# Patient Record
Sex: FEMALE | Race: WHITE | URBAN - METROPOLITAN AREA
[De-identification: names, ages, dates, MRNs, and addresses within clinical notes are randomized per-mention and may not be internally consistent; named-entity substitution may affect disease eponyms.]

---

## 2019-01-08 ENCOUNTER — HISTORICAL (OUTPATIENT)
Dept: CARDIOLOGY | Facility: HOSPITAL | Age: 59
End: 2019-01-08

## 2019-04-16 ENCOUNTER — HISTORICAL (OUTPATIENT)
Dept: RADIOLOGY | Facility: HOSPITAL | Age: 59
End: 2019-04-16

## 2019-08-06 ENCOUNTER — HISTORICAL (OUTPATIENT)
Dept: RADIOLOGY | Facility: HOSPITAL | Age: 59
End: 2019-08-06

## 2020-05-27 ENCOUNTER — HISTORICAL (OUTPATIENT)
Dept: RADIOLOGY | Facility: HOSPITAL | Age: 60
End: 2020-05-27

## 2020-05-27 LAB — POC CREATININE: 0.8 MG/DL (ref 0.6–1.3)

## 2020-06-01 ENCOUNTER — HISTORICAL (OUTPATIENT)
Dept: RADIOLOGY | Facility: HOSPITAL | Age: 60
End: 2020-06-01

## 2020-06-03 ENCOUNTER — HISTORICAL (OUTPATIENT)
Dept: ADMINISTRATIVE | Facility: HOSPITAL | Age: 60
End: 2020-06-03

## 2020-06-03 LAB
ABS NEUT (OLG): 3.15 X10(3)/MCL (ref 2.1–9.2)
ALBUMIN SERPL-MCNC: 3.9 GM/DL (ref 3.5–5)
ALBUMIN/GLOB SERPL: 1.2 RATIO (ref 1.1–2)
ALP SERPL-CCNC: 123 UNIT/L (ref 40–150)
ALT SERPL-CCNC: 25 UNIT/L (ref 0–55)
AST SERPL-CCNC: 25 UNIT/L (ref 5–34)
BASOPHILS # BLD AUTO: 0.1 X10(3)/MCL (ref 0–0.2)
BASOPHILS NFR BLD AUTO: 1.4 %
BILIRUB SERPL-MCNC: 0.4 MG/DL
BILIRUBIN DIRECT+TOT PNL SERPL-MCNC: 0.1 MG/DL (ref 0–0.5)
BILIRUBIN DIRECT+TOT PNL SERPL-MCNC: 0.3 MG/DL (ref 0–0.8)
BUN SERPL-MCNC: 7.1 MG/DL (ref 9.8–20.1)
CALCIUM SERPL-MCNC: 9.3 MG/DL (ref 8.4–10.2)
CEA SERPL-MCNC: 7.75 MG/ML (ref 0–3)
CHLORIDE SERPL-SCNC: 102 MMOL/L (ref 98–107)
CO2 SERPL-SCNC: 26 MMOL/L (ref 22–29)
CREAT SERPL-MCNC: 0.84 MG/DL (ref 0.55–1.02)
EOSINOPHIL # BLD AUTO: 0.1 X10(3)/MCL (ref 0–0.9)
EOSINOPHIL NFR BLD AUTO: 2.8 %
ERYTHROCYTE [DISTWIDTH] IN BLOOD BY AUTOMATED COUNT: 13.2 % (ref 11.5–17)
GLOBULIN SER-MCNC: 3.3 GM/DL (ref 2.4–3.5)
GLUCOSE SERPL-MCNC: 79 MG/DL (ref 74–100)
HCT VFR BLD AUTO: 47.5 % (ref 37–47)
HGB BLD-MCNC: 15.8 GM/DL (ref 12–16)
LYMPHOCYTES # BLD AUTO: 1.2 X10(3)/MCL (ref 0.6–4.6)
LYMPHOCYTES NFR BLD AUTO: 25 %
MCH RBC QN AUTO: 33.6 PG (ref 27–31)
MCHC RBC AUTO-ENTMCNC: 33.3 GM/DL (ref 33–36)
MCV RBC AUTO: 101.1 FL (ref 80–94)
MONOCYTES # BLD AUTO: 0.4 X10(3)/MCL (ref 0.1–1.3)
MONOCYTES NFR BLD AUTO: 7.6 %
NEUTROPHILS # BLD AUTO: 3.2 X10(3)/MCL (ref 2.1–9.2)
NEUTROPHILS NFR BLD AUTO: 63 %
PLATELET # BLD AUTO: 201 X10(3)/MCL (ref 130–400)
PMV BLD AUTO: 10 FL (ref 9.4–12.4)
POTASSIUM SERPL-SCNC: 4.8 MMOL/L (ref 3.5–5.1)
PROT SERPL-MCNC: 7.2 GM/DL (ref 6.4–8.3)
RBC # BLD AUTO: 4.7 X10(6)/MCL (ref 4.2–5.4)
SODIUM SERPL-SCNC: 139 MMOL/L (ref 136–145)
WBC # SPEC AUTO: 5 X10(3)/MCL (ref 4.5–11.5)

## 2020-06-15 ENCOUNTER — HISTORICAL (OUTPATIENT)
Dept: RADIOLOGY | Facility: HOSPITAL | Age: 60
End: 2020-06-15

## 2021-01-27 ENCOUNTER — HISTORICAL (OUTPATIENT)
Dept: ADMINISTRATIVE | Facility: HOSPITAL | Age: 61
End: 2021-01-27

## 2021-01-27 LAB
ABS NEUT (OLG): 4.37 X10(3)/MCL (ref 2.1–9.2)
ALBUMIN SERPL-MCNC: 4.2 GM/DL (ref 3.4–4.8)
ALBUMIN/GLOB SERPL: 1.3 RATIO (ref 1.1–2)
ALP SERPL-CCNC: 124 UNIT/L (ref 40–150)
ALT SERPL-CCNC: 45 UNIT/L (ref 0–55)
AST SERPL-CCNC: 39 UNIT/L (ref 5–34)
BASOPHILS # BLD AUTO: 0.1 X10(3)/MCL (ref 0–0.2)
BASOPHILS NFR BLD AUTO: 0.8 %
BILIRUB SERPL-MCNC: 0.7 MG/DL
BILIRUBIN DIRECT+TOT PNL SERPL-MCNC: 0.2 MG/DL (ref 0–0.5)
BILIRUBIN DIRECT+TOT PNL SERPL-MCNC: 0.5 MG/DL (ref 0–0.8)
BUN SERPL-MCNC: 6.1 MG/DL (ref 9.8–20.1)
CALCIUM SERPL-MCNC: 9.9 MG/DL (ref 8.4–10.2)
CEA SERPL-MCNC: 8.17 NG/ML (ref 0–3)
CHLORIDE SERPL-SCNC: 103 MMOL/L (ref 98–107)
CO2 SERPL-SCNC: 24 MMOL/L (ref 23–31)
CREAT SERPL-MCNC: 0.72 MG/DL (ref 0.55–1.02)
EOSINOPHIL # BLD AUTO: 0.1 X10(3)/MCL (ref 0–0.9)
EOSINOPHIL NFR BLD AUTO: 1.6 %
ERYTHROCYTE [DISTWIDTH] IN BLOOD BY AUTOMATED COUNT: 12.1 % (ref 11.5–17)
GLOBULIN SER-MCNC: 3.2 GM/DL (ref 2.4–3.5)
GLUCOSE SERPL-MCNC: 84 MG/DL (ref 82–115)
HCT VFR BLD AUTO: 52.5 % (ref 37–47)
HGB BLD-MCNC: 18.2 GM/DL (ref 12–16)
LYMPHOCYTES # BLD AUTO: 1.3 X10(3)/MCL (ref 0.6–4.6)
LYMPHOCYTES NFR BLD AUTO: 20.5 %
MCH RBC QN AUTO: 34.7 PG (ref 27–31)
MCHC RBC AUTO-ENTMCNC: 34.7 GM/DL (ref 33–36)
MCV RBC AUTO: 100.2 FL (ref 80–94)
MONOCYTES # BLD AUTO: 0.6 X10(3)/MCL (ref 0.1–1.3)
MONOCYTES NFR BLD AUTO: 9.3 %
NEUTROPHILS # BLD AUTO: 4.3 X10(3)/MCL (ref 2.1–9.2)
NEUTROPHILS NFR BLD AUTO: 67.6 %
PLATELET # BLD AUTO: 174 X10(3)/MCL (ref 130–400)
PMV BLD AUTO: 10.6 FL (ref 9.4–12.4)
POTASSIUM SERPL-SCNC: 4.3 MMOL/L (ref 3.5–5.1)
PROT SERPL-MCNC: 7.4 GM/DL (ref 5.8–7.6)
RBC # BLD AUTO: 5.24 X10(6)/MCL (ref 4.2–5.4)
SODIUM SERPL-SCNC: 140 MMOL/L (ref 136–145)
WBC # SPEC AUTO: 6.5 X10(3)/MCL (ref 4.5–11.5)

## 2021-07-07 ENCOUNTER — HISTORICAL (OUTPATIENT)
Dept: ADMINISTRATIVE | Facility: HOSPITAL | Age: 61
End: 2021-07-07

## 2021-07-07 LAB
ABS NEUT (OLG): 3.25 X10(3)/MCL (ref 2.1–9.2)
ALBUMIN SERPL-MCNC: 3.6 GM/DL (ref 3.4–4.8)
ALBUMIN/GLOB SERPL: 1.2 RATIO (ref 1.1–2)
ALP SERPL-CCNC: 112 UNIT/L (ref 40–150)
ALT SERPL-CCNC: 21 UNIT/L (ref 0–55)
AST SERPL-CCNC: 20 UNIT/L (ref 5–34)
BASOPHILS # BLD AUTO: 0 X10(3)/MCL (ref 0–0.2)
BASOPHILS NFR BLD AUTO: 0.8 %
BILIRUB SERPL-MCNC: 0.4 MG/DL
BILIRUBIN DIRECT+TOT PNL SERPL-MCNC: 0.2 MG/DL (ref 0–0.5)
BILIRUBIN DIRECT+TOT PNL SERPL-MCNC: 0.2 MG/DL (ref 0–0.8)
BUN SERPL-MCNC: 12.5 MG/DL (ref 9.8–20.1)
CALCIUM SERPL-MCNC: 9.9 MG/DL (ref 8.4–10.2)
CEA SERPL-MCNC: 7.8 NG/ML (ref 0–3)
CHLORIDE SERPL-SCNC: 101 MMOL/L (ref 98–107)
CO2 SERPL-SCNC: 29 MMOL/L (ref 23–31)
CREAT SERPL-MCNC: 0.8 MG/DL (ref 0.55–1.02)
EOSINOPHIL # BLD AUTO: 0.2 X10(3)/MCL (ref 0–0.9)
EOSINOPHIL NFR BLD AUTO: 3.3 %
ERYTHROCYTE [DISTWIDTH] IN BLOOD BY AUTOMATED COUNT: 13.1 % (ref 11.5–17)
GLOBULIN SER-MCNC: 3.1 GM/DL (ref 2.4–3.5)
GLUCOSE SERPL-MCNC: 80 MG/DL (ref 82–115)
HCT VFR BLD AUTO: 44 % (ref 37–47)
HGB BLD-MCNC: 15.3 GM/DL (ref 12–16)
LYMPHOCYTES # BLD AUTO: 1.2 X10(3)/MCL (ref 0.6–4.6)
LYMPHOCYTES NFR BLD AUTO: 22.2 %
MCH RBC QN AUTO: 35.8 PG (ref 27–31)
MCHC RBC AUTO-ENTMCNC: 34.8 GM/DL (ref 33–36)
MCV RBC AUTO: 103 FL (ref 80–94)
MONOCYTES # BLD AUTO: 0.6 X10(3)/MCL (ref 0.1–1.3)
MONOCYTES NFR BLD AUTO: 11 %
NEUTROPHILS # BLD AUTO: 3.2 X10(3)/MCL (ref 2.1–9.2)
NEUTROPHILS NFR BLD AUTO: 62.5 %
PLATELET # BLD AUTO: 154 X10(3)/MCL (ref 130–400)
PMV BLD AUTO: 9.6 FL (ref 9.4–12.4)
POTASSIUM SERPL-SCNC: 4.1 MMOL/L (ref 3.5–5.1)
PROT SERPL-MCNC: 6.7 GM/DL (ref 5.8–7.6)
RBC # BLD AUTO: 4.27 X10(6)/MCL (ref 4.2–5.4)
SODIUM SERPL-SCNC: 139 MMOL/L (ref 136–145)
WBC # SPEC AUTO: 5.2 X10(3)/MCL (ref 4.5–11.5)

## 2021-11-03 ENCOUNTER — HISTORICAL (OUTPATIENT)
Dept: HEMATOLOGY/ONCOLOGY | Facility: CLINIC | Age: 61
End: 2021-11-03

## 2021-11-03 LAB
ABS NEUT (OLG): 3.06 X10(3)/MCL (ref 2.1–9.2)
ALBUMIN SERPL-MCNC: 4.2 GM/DL (ref 3.4–4.8)
ALBUMIN/GLOB SERPL: 1.4 RATIO (ref 1.1–2)
ALP SERPL-CCNC: 132 UNIT/L (ref 40–150)
ALT SERPL-CCNC: 35 UNIT/L (ref 0–55)
AST SERPL-CCNC: 28 UNIT/L (ref 5–34)
BASOPHILS # BLD AUTO: 0 X10(3)/MCL (ref 0–0.2)
BASOPHILS NFR BLD AUTO: 0.9 %
BILIRUB SERPL-MCNC: 0.6 MG/DL
BILIRUBIN DIRECT+TOT PNL SERPL-MCNC: 0.2 MG/DL (ref 0–0.5)
BILIRUBIN DIRECT+TOT PNL SERPL-MCNC: 0.4 MG/DL (ref 0–0.8)
BUN SERPL-MCNC: 7.2 MG/DL (ref 9.8–20.1)
CALCIUM SERPL-MCNC: 10.2 MG/DL (ref 8.7–10.5)
CEA SERPL-MCNC: 8.86 NG/ML (ref 0–3)
CHLORIDE SERPL-SCNC: 101 MMOL/L (ref 98–107)
CO2 SERPL-SCNC: 29 MMOL/L (ref 23–31)
CREAT SERPL-MCNC: 0.83 MG/DL (ref 0.55–1.02)
EOSINOPHIL # BLD AUTO: 0.1 X10(3)/MCL (ref 0–0.9)
EOSINOPHIL NFR BLD AUTO: 3 %
ERYTHROCYTE [DISTWIDTH] IN BLOOD BY AUTOMATED COUNT: 13.5 % (ref 11.5–17)
GLOBULIN SER-MCNC: 2.9 GM/DL (ref 2.4–3.5)
GLUCOSE SERPL-MCNC: 106 MG/DL (ref 82–115)
HCT VFR BLD AUTO: 56.6 % (ref 37–47)
HGB BLD-MCNC: 19.3 GM/DL (ref 12–16)
LYMPHOCYTES # BLD AUTO: 1 X10(3)/MCL (ref 0.6–4.6)
LYMPHOCYTES NFR BLD AUTO: 21.6 %
MCH RBC QN AUTO: 36.3 PG (ref 27–31)
MCHC RBC AUTO-ENTMCNC: 34.1 GM/DL (ref 33–36)
MCV RBC AUTO: 106.4 FL (ref 80–94)
MONOCYTES # BLD AUTO: 0.4 X10(3)/MCL (ref 0.1–1.3)
MONOCYTES NFR BLD AUTO: 8.2 %
NEUTROPHILS # BLD AUTO: 3.1 X10(3)/MCL (ref 2.1–9.2)
NEUTROPHILS NFR BLD AUTO: 65.9 %
PLATELET # BLD AUTO: 139 X10(3)/MCL (ref 130–400)
PMV BLD AUTO: 10.1 FL (ref 9.4–12.4)
POTASSIUM SERPL-SCNC: 4.5 MMOL/L (ref 3.5–5.1)
PROT SERPL-MCNC: 7.1 GM/DL (ref 5.8–7.6)
RBC # BLD AUTO: 5.32 X10(6)/MCL (ref 4.2–5.4)
SODIUM SERPL-SCNC: 142 MMOL/L (ref 136–145)
WBC # SPEC AUTO: 4.6 X10(3)/MCL (ref 4.5–11.5)

## 2023-04-13 DIAGNOSIS — F32.89 OTHER DEPRESSION: ICD-10-CM

## 2023-04-13 NOTE — TELEPHONE ENCOUNTER
When I asked about her missing her 4/2022 appointment with us she said her car has been broken down for about a year and will need to check with her daughter to see when she can get help with transportation. I gave her my number and she agreed to call me back to get her appointment scheduled.

## 2023-04-17 RX ORDER — VENLAFAXINE HYDROCHLORIDE 75 MG/1
CAPSULE, EXTENDED RELEASE ORAL
Qty: 90 CAPSULE | Refills: 1 | OUTPATIENT
Start: 2023-04-17

## 2023-04-17 NOTE — TELEPHONE ENCOUNTER
I called and wa unable to reach patient. I left message on her VM to call me about an appt and if she decides not to return to see Dr. Marlow she should contact her PCP for refills of her venlafaxine.

## 2024-11-13 ENCOUNTER — LAB REQUISITION (OUTPATIENT)
Dept: LAB | Facility: HOSPITAL | Age: 64
End: 2024-11-13
Payer: MEDICARE

## 2024-11-13 DIAGNOSIS — I33.0 ACUTE AND SUBACUTE INFECTIVE ENDOCARDITIS: ICD-10-CM

## 2024-11-13 DIAGNOSIS — C20 MALIGNANT NEOPLASM OF RECTUM: ICD-10-CM

## 2024-11-13 LAB
ALBUMIN SERPL-MCNC: 2.9 G/DL (ref 3.4–4.8)
ALBUMIN/GLOB SERPL: 0.9 RATIO (ref 1.1–2)
ALP SERPL-CCNC: 125 UNIT/L (ref 40–150)
ALT SERPL-CCNC: 47 UNIT/L (ref 0–55)
ANION GAP SERPL CALC-SCNC: 14 MEQ/L
AST SERPL-CCNC: 52 UNIT/L (ref 5–34)
BASOPHILS # BLD AUTO: 0.05 X10(3)/MCL
BASOPHILS NFR BLD AUTO: 0.8 %
BILIRUB SERPL-MCNC: 0.6 MG/DL
BUN SERPL-MCNC: 4 MG/DL (ref 9.8–20.1)
CALCIUM SERPL-MCNC: 8.9 MG/DL (ref 8.4–10.2)
CHLORIDE SERPL-SCNC: 103 MMOL/L (ref 98–107)
CO2 SERPL-SCNC: 27 MMOL/L (ref 23–31)
CREAT SERPL-MCNC: 0.61 MG/DL (ref 0.55–1.02)
CREAT/UREA NIT SERPL: 7
CRP SERPL-MCNC: 19.9 MG/L
EOSINOPHIL # BLD AUTO: 0.18 X10(3)/MCL (ref 0–0.9)
EOSINOPHIL NFR BLD AUTO: 2.8 %
ERYTHROCYTE [DISTWIDTH] IN BLOOD BY AUTOMATED COUNT: 12.4 % (ref 11.5–17)
GFR SERPLBLD CREATININE-BSD FMLA CKD-EPI: >60 ML/MIN/1.73/M2
GLOBULIN SER-MCNC: 3.2 GM/DL (ref 2.4–3.5)
GLUCOSE SERPL-MCNC: 100 MG/DL (ref 82–115)
HCT VFR BLD AUTO: 42.2 % (ref 37–47)
HGB BLD-MCNC: 15 G/DL (ref 12–16)
IMM GRANULOCYTES # BLD AUTO: 0.01 X10(3)/MCL (ref 0–0.04)
IMM GRANULOCYTES NFR BLD AUTO: 0.2 %
LYMPHOCYTES # BLD AUTO: 0.99 X10(3)/MCL (ref 0.6–4.6)
LYMPHOCYTES NFR BLD AUTO: 15.3 %
MCH RBC QN AUTO: 32.7 PG (ref 27–31)
MCHC RBC AUTO-ENTMCNC: 35.5 G/DL (ref 33–36)
MCV RBC AUTO: 91.9 FL (ref 80–94)
MONOCYTES # BLD AUTO: 0.66 X10(3)/MCL (ref 0.1–1.3)
MONOCYTES NFR BLD AUTO: 10.2 %
NEUTROPHILS # BLD AUTO: 4.59 X10(3)/MCL (ref 2.1–9.2)
NEUTROPHILS NFR BLD AUTO: 70.7 %
PLATELET # BLD AUTO: 204 X10(3)/MCL (ref 130–400)
PMV BLD AUTO: 12.9 FL (ref 7.4–10.4)
POTASSIUM SERPL-SCNC: 2.9 MMOL/L (ref 3.5–5.1)
PROT SERPL-MCNC: 6.1 GM/DL (ref 5.8–7.6)
RBC # BLD AUTO: 4.59 X10(6)/MCL (ref 4.2–5.4)
SODIUM SERPL-SCNC: 144 MMOL/L (ref 136–145)
WBC # BLD AUTO: 6.48 X10(3)/MCL (ref 4.5–11.5)

## 2024-11-13 PROCEDURE — 86140 C-REACTIVE PROTEIN: CPT | Performed by: INTERNAL MEDICINE

## 2024-11-13 PROCEDURE — 85025 COMPLETE CBC W/AUTO DIFF WBC: CPT | Performed by: INTERNAL MEDICINE

## 2024-11-13 PROCEDURE — 80053 COMPREHEN METABOLIC PANEL: CPT | Performed by: INTERNAL MEDICINE

## 2024-11-14 DIAGNOSIS — Z85.048 HISTORY OF RECTAL OR ANAL CANCER: ICD-10-CM

## 2024-11-14 DIAGNOSIS — Z85.3 HISTORY OF BREAST CANCER: ICD-10-CM

## 2024-11-14 DIAGNOSIS — C78.7 METASTATIC ADENOCARCINOMA TO LIVER: Primary | ICD-10-CM

## 2024-11-22 ENCOUNTER — LAB REQUISITION (OUTPATIENT)
Dept: LAB | Facility: HOSPITAL | Age: 64
End: 2024-11-22
Payer: MEDICARE

## 2024-11-22 DIAGNOSIS — I65.22 OCCLUSION AND STENOSIS OF LEFT CAROTID ARTERY: ICD-10-CM

## 2024-11-22 DIAGNOSIS — I50.22 CHRONIC SYSTOLIC (CONGESTIVE) HEART FAILURE: ICD-10-CM

## 2024-11-22 DIAGNOSIS — I33.0 ACUTE AND SUBACUTE INFECTIVE ENDOCARDITIS: ICD-10-CM

## 2024-11-22 DIAGNOSIS — I25.10 ATHEROSCLEROTIC HEART DISEASE OF NATIVE CORONARY ARTERY WITHOUT ANGINA PECTORIS: ICD-10-CM

## 2024-11-22 DIAGNOSIS — J44.9 CHRONIC OBSTRUCTIVE PULMONARY DISEASE, UNSPECIFIED: ICD-10-CM

## 2024-11-22 DIAGNOSIS — C20 MALIGNANT NEOPLASM OF RECTUM: ICD-10-CM

## 2024-11-22 DIAGNOSIS — R19.5 OTHER FECAL ABNORMALITIES: ICD-10-CM

## 2024-11-22 LAB
ALBUMIN SERPL-MCNC: 2.7 G/DL (ref 3.4–4.8)
ALBUMIN/GLOB SERPL: 0.9 RATIO (ref 1.1–2)
ALP SERPL-CCNC: 130 UNIT/L (ref 40–150)
ALT SERPL-CCNC: 24 UNIT/L (ref 0–55)
ANION GAP SERPL CALC-SCNC: 12 MEQ/L
AST SERPL-CCNC: 43 UNIT/L (ref 5–34)
BASOPHILS # BLD AUTO: 0.04 X10(3)/MCL
BASOPHILS NFR BLD AUTO: 0.8 %
BILIRUB SERPL-MCNC: 1.3 MG/DL
BUN SERPL-MCNC: 5 MG/DL (ref 9.8–20.1)
CALCIUM SERPL-MCNC: 8 MG/DL (ref 8.4–10.2)
CHLORIDE SERPL-SCNC: 96 MMOL/L (ref 98–107)
CO2 SERPL-SCNC: 40 MMOL/L (ref 23–31)
CREAT SERPL-MCNC: 0.63 MG/DL (ref 0.55–1.02)
CREAT/UREA NIT SERPL: 8
CRP SERPL-MCNC: 15.7 MG/L
EOSINOPHIL # BLD AUTO: 0.18 X10(3)/MCL (ref 0–0.9)
EOSINOPHIL NFR BLD AUTO: 3.5 %
ERYTHROCYTE [DISTWIDTH] IN BLOOD BY AUTOMATED COUNT: 12.4 % (ref 11.5–17)
ERYTHROCYTE [SEDIMENTATION RATE] IN BLOOD: 29 MM/HR (ref 0–20)
GFR SERPLBLD CREATININE-BSD FMLA CKD-EPI: >60 ML/MIN/1.73/M2
GLOBULIN SER-MCNC: 3 GM/DL (ref 2.4–3.5)
GLUCOSE SERPL-MCNC: 82 MG/DL (ref 82–115)
HCT VFR BLD AUTO: 36.1 % (ref 37–47)
HGB BLD-MCNC: 12.9 G/DL (ref 12–16)
IMM GRANULOCYTES # BLD AUTO: 0.02 X10(3)/MCL (ref 0–0.04)
IMM GRANULOCYTES NFR BLD AUTO: 0.4 %
LYMPHOCYTES # BLD AUTO: 1.04 X10(3)/MCL (ref 0.6–4.6)
LYMPHOCYTES NFR BLD AUTO: 20 %
MCH RBC QN AUTO: 32.2 PG (ref 27–31)
MCHC RBC AUTO-ENTMCNC: 35.7 G/DL (ref 33–36)
MCV RBC AUTO: 90 FL (ref 80–94)
MONOCYTES # BLD AUTO: 0.47 X10(3)/MCL (ref 0.1–1.3)
MONOCYTES NFR BLD AUTO: 9.1 %
NEUTROPHILS # BLD AUTO: 3.44 X10(3)/MCL (ref 2.1–9.2)
NEUTROPHILS NFR BLD AUTO: 66.2 %
NRBC BLD AUTO-RTO: 0 %
PLATELET # BLD AUTO: 169 X10(3)/MCL (ref 130–400)
PMV BLD AUTO: 11.4 FL (ref 7.4–10.4)
POTASSIUM SERPL-SCNC: 2.2 MMOL/L (ref 3.5–5.1)
PROT SERPL-MCNC: 5.7 GM/DL (ref 5.8–7.6)
RBC # BLD AUTO: 4.01 X10(6)/MCL (ref 4.2–5.4)
SODIUM SERPL-SCNC: 148 MMOL/L (ref 136–145)
WBC # BLD AUTO: 5.19 X10(3)/MCL (ref 4.5–11.5)

## 2024-11-22 PROCEDURE — 80053 COMPREHEN METABOLIC PANEL: CPT | Performed by: NURSE PRACTITIONER

## 2024-11-22 PROCEDURE — 86140 C-REACTIVE PROTEIN: CPT | Performed by: NURSE PRACTITIONER

## 2024-11-22 PROCEDURE — 85652 RBC SED RATE AUTOMATED: CPT | Performed by: NURSE PRACTITIONER

## 2024-11-22 PROCEDURE — 85025 COMPLETE CBC W/AUTO DIFF WBC: CPT | Performed by: NURSE PRACTITIONER

## 2024-11-27 NOTE — PROGRESS NOTES
Subjective:       Patient ID: Natasha Padilla is a 64 y.o. female.     Chief Complaint: Newly diagnosed rectal cancer    Diagnosis: Metastatic rectal cancer                    Stage IA right breast cancer 7/20 (T1b N0 m0) - 1.0 cm, GII, ER/CT+, Her-2 neg, Oncotype RS 17                    Locally advanced, neglected left breast cancer 8/12 - clinical stage IIIC (T4c N3b M0) - ER/CT neg,  Her-2 neg                    S/p bilateral mastectomies                         Postmenopausal with intact uetrus    Treatment History  DD AC x4 --> L mastectomy 1/15 --> Weekly Taxol x12 (5/15) --> XRT  Resection AMARILIS met 6/16  Letrozole 7/20 - 3/22    Current Treatment: Treatment planning; lost to follow-up    Clinical History:  Patient was initially diagnosed with left breast cancer 8/12 by biopsy positive for infiltrating ductal carcinoma.  Pathology showed a grade 3 infiltrating ductal carcinoma, ER +1%, CT +83% and HER-2 equivocal at 2+.  She did not return for follow-up and did not receive any treatment at that time.  She presented with an enlarging left breast mass 10/14 and repeat biopsy at that time showed a grade 2 infiltrating ductal carcinoma that was triple negative for hormone receptor and HER-2 expression.  Metastatic workup was negative.  She received neoadjuvant chemotherapy followed by left mastectomy, adjuvant chemotherapy and radiation.  She had a solitary left upper lobe lung metastasis resected 6/16.  Pathology was consistent with metastatic breast cancer.  She had an early stage breast cancer on the right side with completely different pathology and underwent a right mastectomy followed by adjuvant hormonal therapy with an AI.  She was last seen in the office for follow-up 11/24/21 and did not return for additional visits.  She stopped taking her letrozole several months after her last appointment.  She was not seeing anyone for regular health maintenance and did not have a primary care provider.    She  presented to the ER at [facility] 10/21/24 after her family found her down on the bathroom floor at home.  She had right-sided weakness, affecting the arm more than the leg.  MRI of the brain showed small acute infarcts in the right cerebellum and left occipital lobes.  Admission H&H was 22 and 62.5 leading to a Hematology consult.  A bone marrow aspirate and biopsy was normal and she tested negative for a JAK2 mutation.  She was initiated on anticoagulation and developed rectal bleeding.  She reported a history of rectal bleeding intermittently for several months prior to admission.  Colonoscopy 11/5/24 showed a malignant appearing rectal mass 5-10 cm from the anal verge.  Multiple polyps were also removed.  Biopsy of the rectal mass was positive for invasive adenocarcinoma.      CT C/A/P 11/5/24:  Left apical scarring and a 2 mm nodule in the right mid lung.  There were multiple hepatic metastases, largest in the left lobe measured 3.9 x 3.1 cm.   MRI pelvis 11/6/24: T3 N1 rectal primary.    CT-guided liver biopsy 11/7/24:  Metastatic colorectal adenocarcinoma.  ECHO 10/20/24:  Global HK, EF 40-45%.  Mild-to-moderate MVR.    She was subsequently diagnosed with mitral valve endocarditis and 6 weeks of IV antibiotics were recommended.  She left the hospital AMA without scheduling any additional outpatient follow-up.    Interval History  She presents to my office today for a hospital follow-up visit accompanied by her daughter.  Patient is ambulatory with a rolling walker.  She has been doing home PT and OT through home health.  She will complete 6 weeks of IV Unasyn today for her endocarditis.  She does not currently have a PCP or follow-up scheduled with Cardiology.  She reports intermittent rectal bleeding which has improved.  She is not currently on any anticoagulation.  She has lost approximately 20 lb over the past several months.  She does feel pressure in the rectum but denies significant constipation or difficulty  passing her bowels.  She does not have any abdominal pain.  She reports no changes on her self examination following bilateral mastectomies.    Review of Systems   Constitutional:  Positive for activity change, appetite change, fatigue and unexpected weight change. Negative for fever.   HENT:  Negative for mouth sores, nosebleeds, sore throat and trouble swallowing.    Eyes: Negative.    Respiratory:  Negative for cough and shortness of breath.    Cardiovascular:  Negative for chest pain, palpitations and leg swelling.   Gastrointestinal:  Positive for blood in stool. Negative for abdominal distention, abdominal pain, change in bowel habit, constipation, diarrhea and nausea.   Genitourinary:  Negative for dysuria, flank pain, frequency and hematuria.   Musculoskeletal:  Positive for arthralgias. Negative for back pain.   Integumentary:  Negative for pallor and rash.   Neurological:  Positive for speech difficulty (mild), weakness and coordination difficulties. Negative for dizziness, numbness and headaches.   Hematological:  Negative for adenopathy. Does not bruise/bleed easily.   Psychiatric/Behavioral:  Positive for depressed mood. Negative for confusion and suicidal ideas. The patient is nervous/anxious.        PMHx:  CAD with stent, hyperlipidemia, carotid stenosis with right carotid stent, COPD, bilateral breast cancer, CVA, PVD, depression, alcohol abuse  PSHx:  Tonsils, left mastectomy 1/15, MediPort insertion and removal, jaw surgery, left hand surgery, AMARILIS wedge resection 6/16, right carotid stent, iliac artery stent, right mastectomy 7/20, bone marrow  SH:  Long-term smoker 1 ppd, quit 10/24.  Previous heavy alcohol use, quit 10/24.  Lives alone in [place], her daughter lives across the street.  FH:  Her sister had kidney cancer.  A paternal aunt had breast cancer and colon cancer, another paternal aunt had colon cancer.    Objective:        /60 (BP Location: Right leg, Patient Position: Sitting)    "Pulse 98   Temp 98.8 °F (37.1 °C)   Resp 16   Ht 5' 4" (1.626 m)   Wt 54 kg (119 lb)   SpO2 98%   BMI 20.43 kg/m²    Physical Exam  Constitutional:       Comments: Ill-appearing WF in NAD, ambulatory with a rolling walker   HENT:      Head: Normocephalic.      Mouth/Throat:      Mouth: Mucous membranes are moist.      Pharynx: Oropharynx is clear. No posterior oropharyngeal erythema.   Eyes:      General: No scleral icterus.     Extraocular Movements: Extraocular movements intact.      Conjunctiva/sclera: Conjunctivae normal.      Pupils: Pupils are equal, round, and reactive to light.   Cardiovascular:      Rate and Rhythm: Normal rate and regular rhythm.      Heart sounds: No murmur heard.  Pulmonary:      Comments: Decreased breath sounds at the bases, otherwise clear  Chest:      Comments: Well-healed bilateral mastectomy incisions.  No suspicious masses, skin changes or axillary nodes bilaterally.  Abdominal:      General: Bowel sounds are normal. There is no distension.      Palpations: Abdomen is soft. There is no mass.      Tenderness: There is no abdominal tenderness.      Comments: No palpable masses or organomegaly.   Musculoskeletal:         General: No swelling or tenderness. Normal range of motion.      Cervical back: Neck supple. No tenderness.   Lymphadenopathy:      Cervical: No cervical adenopathy.      Upper Body:      Right upper body: No supraclavicular or axillary adenopathy.      Left upper body: No supraclavicular or axillary adenopathy.   Skin:     General: Skin is warm and dry.      Findings: Bruising (scattered ecchymoses on arms) present. No rash.      Comments: PICC line LUE   Neurological:      Mental Status: She is alert and oriented to person, place, and time.      Cranial Nerves: No cranial nerve deficit.      Motor: Weakness (right-sided weakness, arm> leg) present.      Coordination: Coordination abnormal.   Psychiatric:         Mood and Affect: Mood normal.         Thought " Content: Thought content normal.         Judgment: Judgment normal.       ECOG SCORE    2 - Capable of all selfcare but unable to carry out any work activities, active > 50% of hours          LABORATORY  Recent Results (from the past week)   Comprehensive Metabolic Panel    Collection Time: 12/06/24  1:15 PM   Result Value Ref Range    Sodium 147 (H) 136 - 145 mmol/L    Potassium 2.3 (LL) 3.5 - 5.1 mmol/L    Chloride 98 98 - 107 mmol/L    CO2 36 (H) 23 - 31 mmol/L    Glucose 75 (L) 82 - 115 mg/dL    Blood Urea Nitrogen 3.0 (L) 9.8 - 20.1 mg/dL    Creatinine 0.61 0.55 - 1.02 mg/dL    Calcium 7.6 (L) 8.4 - 10.2 mg/dL    Protein Total 5.3 (L) 5.8 - 7.6 gm/dL    Albumin 2.2 (L) 3.4 - 4.8 g/dL    Globulin 3.1 2.4 - 3.5 gm/dL    Albumin/Globulin Ratio 0.7 (L) 1.1 - 2.0 ratio    Bilirubin Total 0.7 <=1.5 mg/dL     40 - 150 unit/L    ALT 41 0 - 55 unit/L    AST 70 (H) 5 - 34 unit/L    eGFR >60 mL/min/1.73/m2    Anion Gap 13.0 mEq/L    BUN/Creatinine Ratio 5    Sedimentation rate    Collection Time: 12/06/24  1:15 PM   Result Value Ref Range    Sed Rate 28 (H) 0 - 20 mm/hr   C-Reactive Protein    Collection Time: 12/06/24  1:15 PM   Result Value Ref Range    CRP 17.40 (H) <5.00 mg/L   CBC with Differential    Collection Time: 12/06/24  1:15 PM   Result Value Ref Range    WBC 6.33 4.50 - 11.50 x10(3)/mcL    RBC 3.33 (L) 4.20 - 5.40 x10(6)/mcL    Hgb 10.9 (L) 12.0 - 16.0 g/dL    Hct 30.8 (L) 37.0 - 47.0 %    MCV 92.5 80.0 - 94.0 fL    MCH 32.7 (H) 27.0 - 31.0 pg    MCHC 35.4 33.0 - 36.0 g/dL    RDW 13.5 11.5 - 17.0 %    Platelet 230 130 - 400 x10(3)/mcL    MPV 12.3 (H) 7.4 - 10.4 fL    Neut % 70.1 %    Lymph % 16.9 %    Mono % 9.3 %    Eos % 2.8 %    Basophil % 0.6 %    Lymph # 1.07 0.6 - 4.6 x10(3)/mcL    Neut # 4.43 2.1 - 9.2 x10(3)/mcL    Mono # 0.59 0.1 - 1.3 x10(3)/mcL    Eos # 0.18 0 - 0.9 x10(3)/mcL    Baso # 0.04 <=0.2 x10(3)/mcL    IG# 0.02 0 - 0.04 x10(3)/mcL    IG% 0.3 %   CBC with Differential    Collection  Time: 12/09/24  2:25 PM   Result Value Ref Range    WBC 6.05 4.50 - 11.50 x10(3)/mcL    RBC 3.47 (L) 4.20 - 5.40 x10(6)/mcL    Hgb 11.3 (L) 12.0 - 16.0 g/dL    Hct 33.0 (L) 37.0 - 47.0 %    MCV 95.1 (H) 80.0 - 94.0 fL    MCH 32.6 (H) 27.0 - 31.0 pg    MCHC 34.2 33.0 - 36.0 g/dL    RDW 13.8 11.5 - 17.0 %    Platelet 216 130 - 400 x10(3)/mcL    MPV 10.4 7.4 - 10.4 fL    Neut % 69.0 %    Lymph % 19.0 %    Mono % 8.6 %    Eos % 2.5 %    Basophil % 0.7 %    Lymph # 1.15 0.6 - 4.6 x10(3)/mcL    Neut # 4.18 2.1 - 9.2 x10(3)/mcL    Mono # 0.52 0.1 - 1.3 x10(3)/mcL    Eos # 0.15 0 - 0.9 x10(3)/mcL    Baso # 0.04 <=0.2 x10(3)/mcL    IG# 0.01 0 - 0.04 x10(3)/mcL    IG% 0.2 %          Assessment:   Metastatic rectal cancer  Recent CVA with right-sided hemiparesis  Mitral valve endocarditis - completing 6 weeks of IV antibiotics  Malnutrition and hypoalbuminemia  Mild anemia  Hypokalemia  Stage IA right breast cancer 7/20 - ANNA  Locally advanced, neglected left breast cancer 8/12 - ANNA  S/p bilateral mastectomies  Postmenopausal with intact uterus      Plan:   Extensive outside treatment records from her recent hospitalization and workup at [facility] were reviewed in preparation for this visit.  Her laboratory, pathology and imaging results were all reviewed and discussed in detail with the patient and her daughter.  She has biopsy-proven metastatic rectal cancer.  Her disease is incurable and any treatment would be delivered with palliative intent including relief of symptoms, prevention of disease-related complications and prolongation of life.    She does want to consider palliative treatment as long as she can tolerate it without significant toxicity.  I recommended initial treatment with IV Oxaliplatin 130 mg/m2 D1 + Xeloda at a starting dose of 1000 mg b.i.d. D1-14 until tolerance can be established on a 21 day treatment cycle.  Benefits, risks, toxicities and side effects of this treatment regimen were discussed and reviewed  in detail. All questions were answered. Literature regarding the treatment plan and specific drug information was also provided.  Her PICC line will remain in place for safe administration of IV chemotherapy.  Dressing changes are currently being done by her home health agency.  A baseline CEA level was obtained today.  She will continue oral potassium supplementation and may require additional IV supplementation as well.  She has completed the recommended 6 weeks of IV antibiotics for her endocarditis.  Referral will be made to cardiology for evaluation and follow-up.  She will also be referred to establish primary care follow-up.  She was previously seen by Dr. Connor in [place].  It is not safe for her to start oral anticoagulation due to increased risk of bleeding associated with her rectal cancer.  She will be tentatively scheduled to start treatment in approximately one-week after appropriate outpatient education to be provided to the patient and her daughter.  The importance of compliance with therapy and outpatient follow-up was discussed in detail with the patient and her daughter.  I will plan to re-evaluate her 2 weeks after her 1st cycle of palliative chemotherapy.  If she does well with the recommended treatment and her performance status improves, we will consider the addition of Bevacizumab.        KARLA PARKS MD    Other Physicians  Dr. Marisa Vang

## 2024-12-06 ENCOUNTER — LAB REQUISITION (OUTPATIENT)
Dept: LAB | Facility: HOSPITAL | Age: 64
End: 2024-12-06
Payer: MEDICARE

## 2024-12-06 DIAGNOSIS — I38 ENDOCARDITIS, VALVE UNSPECIFIED: ICD-10-CM

## 2024-12-06 DIAGNOSIS — I33.0 ACUTE AND SUBACUTE INFECTIVE ENDOCARDITIS: ICD-10-CM

## 2024-12-06 DIAGNOSIS — C20 MALIGNANT NEOPLASM OF RECTUM: ICD-10-CM

## 2024-12-06 LAB
ALBUMIN SERPL-MCNC: 2.2 G/DL (ref 3.4–4.8)
ALBUMIN/GLOB SERPL: 0.7 RATIO (ref 1.1–2)
ALP SERPL-CCNC: 136 UNIT/L (ref 40–150)
ALT SERPL-CCNC: 41 UNIT/L (ref 0–55)
ANION GAP SERPL CALC-SCNC: 13 MEQ/L
AST SERPL-CCNC: 70 UNIT/L (ref 5–34)
BASOPHILS # BLD AUTO: 0.04 X10(3)/MCL
BASOPHILS NFR BLD AUTO: 0.6 %
BILIRUB SERPL-MCNC: 0.7 MG/DL
BUN SERPL-MCNC: 3 MG/DL (ref 9.8–20.1)
CALCIUM SERPL-MCNC: 7.6 MG/DL (ref 8.4–10.2)
CHLORIDE SERPL-SCNC: 98 MMOL/L (ref 98–107)
CO2 SERPL-SCNC: 36 MMOL/L (ref 23–31)
CREAT SERPL-MCNC: 0.61 MG/DL (ref 0.55–1.02)
CREAT/UREA NIT SERPL: 5
CRP SERPL-MCNC: 17.4 MG/L
EOSINOPHIL # BLD AUTO: 0.18 X10(3)/MCL (ref 0–0.9)
EOSINOPHIL NFR BLD AUTO: 2.8 %
ERYTHROCYTE [DISTWIDTH] IN BLOOD BY AUTOMATED COUNT: 13.5 % (ref 11.5–17)
ERYTHROCYTE [SEDIMENTATION RATE] IN BLOOD: 28 MM/HR (ref 0–20)
GFR SERPLBLD CREATININE-BSD FMLA CKD-EPI: >60 ML/MIN/1.73/M2
GLOBULIN SER-MCNC: 3.1 GM/DL (ref 2.4–3.5)
GLUCOSE SERPL-MCNC: 75 MG/DL (ref 82–115)
HCT VFR BLD AUTO: 30.8 % (ref 37–47)
HGB BLD-MCNC: 10.9 G/DL (ref 12–16)
IMM GRANULOCYTES # BLD AUTO: 0.02 X10(3)/MCL (ref 0–0.04)
IMM GRANULOCYTES NFR BLD AUTO: 0.3 %
LYMPHOCYTES # BLD AUTO: 1.07 X10(3)/MCL (ref 0.6–4.6)
LYMPHOCYTES NFR BLD AUTO: 16.9 %
MCH RBC QN AUTO: 32.7 PG (ref 27–31)
MCHC RBC AUTO-ENTMCNC: 35.4 G/DL (ref 33–36)
MCV RBC AUTO: 92.5 FL (ref 80–94)
MONOCYTES # BLD AUTO: 0.59 X10(3)/MCL (ref 0.1–1.3)
MONOCYTES NFR BLD AUTO: 9.3 %
NEUTROPHILS # BLD AUTO: 4.43 X10(3)/MCL (ref 2.1–9.2)
NEUTROPHILS NFR BLD AUTO: 70.1 %
PLATELET # BLD AUTO: 230 X10(3)/MCL (ref 130–400)
PMV BLD AUTO: 12.3 FL (ref 7.4–10.4)
POTASSIUM SERPL-SCNC: 2.3 MMOL/L (ref 3.5–5.1)
PROT SERPL-MCNC: 5.3 GM/DL (ref 5.8–7.6)
RBC # BLD AUTO: 3.33 X10(6)/MCL (ref 4.2–5.4)
SODIUM SERPL-SCNC: 147 MMOL/L (ref 136–145)
WBC # BLD AUTO: 6.33 X10(3)/MCL (ref 4.5–11.5)

## 2024-12-06 PROCEDURE — 86140 C-REACTIVE PROTEIN: CPT | Performed by: INTERNAL MEDICINE

## 2024-12-06 PROCEDURE — 85025 COMPLETE CBC W/AUTO DIFF WBC: CPT | Performed by: INTERNAL MEDICINE

## 2024-12-06 PROCEDURE — 80053 COMPREHEN METABOLIC PANEL: CPT | Performed by: INTERNAL MEDICINE

## 2024-12-06 PROCEDURE — 85652 RBC SED RATE AUTOMATED: CPT | Performed by: INTERNAL MEDICINE

## 2024-12-09 ENCOUNTER — OFFICE VISIT (OUTPATIENT)
Dept: HEMATOLOGY/ONCOLOGY | Facility: CLINIC | Age: 64
End: 2024-12-09
Payer: MEDICARE

## 2024-12-09 VITALS
TEMPERATURE: 99 F | WEIGHT: 119 LBS | HEIGHT: 64 IN | OXYGEN SATURATION: 98 % | HEART RATE: 98 BPM | DIASTOLIC BLOOD PRESSURE: 60 MMHG | BODY MASS INDEX: 20.32 KG/M2 | SYSTOLIC BLOOD PRESSURE: 110 MMHG | RESPIRATION RATE: 16 BRPM

## 2024-12-09 DIAGNOSIS — C20 RECTAL CANCER: Primary | ICD-10-CM

## 2024-12-09 DIAGNOSIS — Z85.3 HISTORY OF BREAST CANCER: ICD-10-CM

## 2024-12-09 DIAGNOSIS — Z85.048 HISTORY OF RECTAL OR ANAL CANCER: ICD-10-CM

## 2024-12-09 DIAGNOSIS — C50.411 MALIGNANT NEOPLASM OF UPPER-OUTER QUADRANT OF RIGHT BREAST IN FEMALE, ESTROGEN RECEPTOR POSITIVE: ICD-10-CM

## 2024-12-09 DIAGNOSIS — E87.6 HYPOKALEMIA: ICD-10-CM

## 2024-12-09 DIAGNOSIS — C78.7 METASTATIC ADENOCARCINOMA TO LIVER: ICD-10-CM

## 2024-12-09 DIAGNOSIS — Z17.0 MALIGNANT NEOPLASM OF UPPER-OUTER QUADRANT OF RIGHT BREAST IN FEMALE, ESTROGEN RECEPTOR POSITIVE: ICD-10-CM

## 2024-12-09 DIAGNOSIS — C78.7 SECONDARY MALIGNANT NEOPLASM OF LIVER: ICD-10-CM

## 2024-12-09 LAB
ALBUMIN SERPL-MCNC: 2.3 G/DL (ref 3.4–4.8)
ALBUMIN/GLOB SERPL: 0.6 RATIO (ref 1.1–2)
ALP SERPL-CCNC: 142 UNIT/L (ref 40–150)
ALT SERPL-CCNC: 51 UNIT/L (ref 0–55)
ANION GAP SERPL CALC-SCNC: 13 MEQ/L
AST SERPL-CCNC: 91 UNIT/L (ref 5–34)
BASOPHILS # BLD AUTO: 0.04 X10(3)/MCL
BASOPHILS NFR BLD AUTO: 0.7 %
BILIRUB SERPL-MCNC: 0.7 MG/DL
BUN SERPL-MCNC: 3.3 MG/DL (ref 9.8–20.1)
CALCIUM SERPL-MCNC: 8 MG/DL (ref 8.4–10.2)
CEA SERPL-MCNC: 63.88 NG/ML (ref 0–3)
CHLORIDE SERPL-SCNC: 99 MMOL/L (ref 98–107)
CO2 SERPL-SCNC: 34 MMOL/L (ref 23–31)
CREAT SERPL-MCNC: 0.66 MG/DL (ref 0.55–1.02)
CREAT/UREA NIT SERPL: 5
EOSINOPHIL # BLD AUTO: 0.15 X10(3)/MCL (ref 0–0.9)
EOSINOPHIL NFR BLD AUTO: 2.5 %
ERYTHROCYTE [DISTWIDTH] IN BLOOD BY AUTOMATED COUNT: 13.8 % (ref 11.5–17)
GFR SERPLBLD CREATININE-BSD FMLA CKD-EPI: >60 ML/MIN/1.73/M2
GLOBULIN SER-MCNC: 3.7 GM/DL (ref 2.4–3.5)
GLUCOSE SERPL-MCNC: 86 MG/DL (ref 82–115)
HCT VFR BLD AUTO: 33 % (ref 37–47)
HGB BLD-MCNC: 11.3 G/DL (ref 12–16)
IMM GRANULOCYTES # BLD AUTO: 0.01 X10(3)/MCL (ref 0–0.04)
IMM GRANULOCYTES NFR BLD AUTO: 0.2 %
LYMPHOCYTES # BLD AUTO: 1.15 X10(3)/MCL (ref 0.6–4.6)
LYMPHOCYTES NFR BLD AUTO: 19 %
MAGNESIUM SERPL-MCNC: 1.7 MG/DL (ref 1.6–2.6)
MCH RBC QN AUTO: 32.6 PG (ref 27–31)
MCHC RBC AUTO-ENTMCNC: 34.2 G/DL (ref 33–36)
MCV RBC AUTO: 95.1 FL (ref 80–94)
MONOCYTES # BLD AUTO: 0.52 X10(3)/MCL (ref 0.1–1.3)
MONOCYTES NFR BLD AUTO: 8.6 %
NEUTROPHILS # BLD AUTO: 4.18 X10(3)/MCL (ref 2.1–9.2)
NEUTROPHILS NFR BLD AUTO: 69 %
PLATELET # BLD AUTO: 216 X10(3)/MCL (ref 130–400)
PMV BLD AUTO: 10.4 FL (ref 7.4–10.4)
POTASSIUM SERPL-SCNC: 2.1 MMOL/L (ref 3.5–5.1)
PROT SERPL-MCNC: 6 GM/DL (ref 5.8–7.6)
RBC # BLD AUTO: 3.47 X10(6)/MCL (ref 4.2–5.4)
SODIUM SERPL-SCNC: 146 MMOL/L (ref 136–145)
WBC # BLD AUTO: 6.05 X10(3)/MCL (ref 4.5–11.5)

## 2024-12-09 PROCEDURE — 82378 CARCINOEMBRYONIC ANTIGEN: CPT | Performed by: INTERNAL MEDICINE

## 2024-12-09 PROCEDURE — 99215 OFFICE O/P EST HI 40 MIN: CPT | Mod: S$PBB,,, | Performed by: INTERNAL MEDICINE

## 2024-12-09 PROCEDURE — 80053 COMPREHEN METABOLIC PANEL: CPT | Performed by: INTERNAL MEDICINE

## 2024-12-09 PROCEDURE — 99215 OFFICE O/P EST HI 40 MIN: CPT | Mod: PBBFAC | Performed by: INTERNAL MEDICINE

## 2024-12-09 PROCEDURE — 83735 ASSAY OF MAGNESIUM: CPT | Performed by: INTERNAL MEDICINE

## 2024-12-09 PROCEDURE — 85025 COMPLETE CBC W/AUTO DIFF WBC: CPT | Performed by: INTERNAL MEDICINE

## 2024-12-09 PROCEDURE — 99999 PR PBB SHADOW E&M-EST. PATIENT-LVL V: CPT | Mod: PBBFAC,,, | Performed by: INTERNAL MEDICINE

## 2024-12-09 PROCEDURE — 36415 COLL VENOUS BLD VENIPUNCTURE: CPT | Performed by: INTERNAL MEDICINE

## 2024-12-09 RX ORDER — HYDROCODONE BITARTRATE AND ACETAMINOPHEN 5; 325 MG/1; MG/1
1 TABLET ORAL EVERY 8 HOURS PRN
COMMUNITY
Start: 2024-11-11 | End: 2024-12-09

## 2024-12-09 RX ORDER — ATORVASTATIN CALCIUM 80 MG/1
80 TABLET, FILM COATED ORAL
COMMUNITY
Start: 2024-11-11 | End: 2024-12-11

## 2024-12-09 RX ORDER — AMPICILLIN AND SULBACTAM 100; 50 MG/ML; MG/ML
INJECTION, POWDER, FOR SOLUTION INTRAVENOUS
COMMUNITY
Start: 2024-12-04

## 2024-12-09 RX ORDER — SODIUM CHLORIDE 9 MG/ML
INJECTION, SOLUTION INTRAVENOUS
COMMUNITY
Start: 2024-12-04

## 2024-12-09 RX ORDER — MULTIVITAMIN
1 TABLET ORAL DAILY
COMMUNITY

## 2024-12-09 RX ORDER — METOPROLOL SUCCINATE 25 MG/1
12.5 TABLET, EXTENDED RELEASE ORAL
COMMUNITY
Start: 2024-11-11 | End: 2024-12-09

## 2024-12-10 ENCOUNTER — TELEPHONE (OUTPATIENT)
Dept: HEMATOLOGY/ONCOLOGY | Facility: CLINIC | Age: 64
End: 2024-12-10
Payer: MEDICARE

## 2024-12-10 DIAGNOSIS — E87.6 HYPOKALEMIA: Primary | ICD-10-CM

## 2024-12-10 DIAGNOSIS — C20 RECTAL CANCER: Primary | ICD-10-CM

## 2024-12-10 DIAGNOSIS — C78.7 SECONDARY MALIGNANT NEOPLASM OF LIVER: ICD-10-CM

## 2024-12-10 RX ORDER — POTASSIUM CHLORIDE 750 MG/1
20 CAPSULE, EXTENDED RELEASE ORAL 2 TIMES DAILY
Qty: 28 CAPSULE | Refills: 0 | Status: SHIPPED | OUTPATIENT
Start: 2024-12-10 | End: 2024-12-17

## 2024-12-10 RX ORDER — HEPARIN 100 UNIT/ML
500 SYRINGE INTRAVENOUS
OUTPATIENT
Start: 2024-12-16

## 2024-12-10 RX ORDER — SODIUM CHLORIDE 0.9 % (FLUSH) 0.9 %
10 SYRINGE (ML) INJECTION
OUTPATIENT
Start: 2024-12-16

## 2024-12-10 RX ORDER — CAPECITABINE 500 MG/1
1000 TABLET, FILM COATED ORAL 2 TIMES DAILY
Qty: 56 TABLET | Refills: 5 | Status: ACTIVE | OUTPATIENT
Start: 2024-12-10

## 2024-12-10 RX ORDER — EPINEPHRINE 0.3 MG/.3ML
0.3 INJECTION SUBCUTANEOUS ONCE AS NEEDED
OUTPATIENT
Start: 2024-12-16

## 2024-12-10 RX ORDER — DIPHENHYDRAMINE HYDROCHLORIDE 50 MG/ML
50 INJECTION INTRAMUSCULAR; INTRAVENOUS ONCE AS NEEDED
OUTPATIENT
Start: 2024-12-16

## 2024-12-10 NOTE — TELEPHONE ENCOUNTER
Phoned and spoke with patient daughter and advised her that her mother's K+ level drawn yesterday is low again and Dr. Marlow wants her to take Micro K BID for the next week. Rx sent to her pharmacy.

## 2024-12-10 NOTE — TELEPHONE ENCOUNTER
Nurse with [facility] (no name given) left message inquiring about picc care and asking if Dr. Marlow will sign orders for home health care management. Okay to send orders for both?

## 2024-12-12 ENCOUNTER — OFFICE VISIT (OUTPATIENT)
Dept: HEMATOLOGY/ONCOLOGY | Facility: CLINIC | Age: 64
End: 2024-12-12
Payer: MEDICARE

## 2024-12-12 ENCOUNTER — CLINICAL SUPPORT (OUTPATIENT)
Dept: HEMATOLOGY/ONCOLOGY | Facility: CLINIC | Age: 64
End: 2024-12-12
Payer: MEDICARE

## 2024-12-12 VITALS
TEMPERATURE: 98 F | BODY MASS INDEX: 20.29 KG/M2 | HEART RATE: 71 BPM | SYSTOLIC BLOOD PRESSURE: 142 MMHG | HEIGHT: 64 IN | RESPIRATION RATE: 16 BRPM | DIASTOLIC BLOOD PRESSURE: 80 MMHG | WEIGHT: 118.88 LBS

## 2024-12-12 DIAGNOSIS — Z85.3 HISTORY OF BREAST CANCER: ICD-10-CM

## 2024-12-12 DIAGNOSIS — C78.7 SECONDARY MALIGNANT NEOPLASM OF LIVER: ICD-10-CM

## 2024-12-12 DIAGNOSIS — R45.89 ANXIETY ABOUT HEALTH: ICD-10-CM

## 2024-12-12 DIAGNOSIS — C20 RECTAL CANCER: ICD-10-CM

## 2024-12-12 DIAGNOSIS — C20 RECTAL CANCER: Primary | ICD-10-CM

## 2024-12-12 PROCEDURE — 99999 PR PBB SHADOW E&M-EST. PATIENT-LVL II: CPT | Mod: PBBFAC,,,

## 2024-12-12 PROCEDURE — 99215 OFFICE O/P EST HI 40 MIN: CPT | Mod: S$PBB,,, | Performed by: NURSE PRACTITIONER

## 2024-12-12 PROCEDURE — 99999 PR PBB SHADOW E&M-EST. PATIENT-LVL IV: CPT | Mod: PBBFAC,,, | Performed by: NURSE PRACTITIONER

## 2024-12-12 PROCEDURE — 99212 OFFICE O/P EST SF 10 MIN: CPT | Mod: PBBFAC,27

## 2024-12-12 PROCEDURE — 99214 OFFICE O/P EST MOD 30 MIN: CPT | Mod: PBBFAC | Performed by: NURSE PRACTITIONER

## 2024-12-12 RX ORDER — ONDANSETRON HYDROCHLORIDE 8 MG/1
TABLET, FILM COATED ORAL
Qty: 30 TABLET | Refills: 3 | Status: SHIPPED | OUTPATIENT
Start: 2024-12-12

## 2024-12-12 NOTE — NURSING
Oncology Navigation   Intake  Date of Diagnosis: 24  Cancer Type: GI     Treatment  Current Status: Staging work-up       Medical Oncologist: [place]  Consult Date: 24  Chemotherapy: Planned  Chemotherapy Regimen: OP COLORECTAL CAPECITABINE OXALIPLATIN          General Referrals: Dietitian; Justino Still          Support Systems: Children; Family members  Barriers of Care: Transportation; Dietary / Nutrition  Dietary / Nutrition: Weight loss / gain  Transportation Barriers: Family only able to bring her on  and --appointments adjusted     Acuity  Stage: 2  Systemic Treatment - predicted or initiated: Chemotherapy Regimen with Multiple drugs (+1)  Treatment Tolerability: Has not started treatment yet/treatment fully completed and side effects resolved  ECO  Comorbidities in Medical History: 2  Hospitalization Within the Past Month: 0  Other Medical Factors (+2 each): In wheelchair   Needed: 0  Support: 0  Verbalizes Financial Concerns: 0  Transportation: 1  Mental Health: PHQ Score: 2  Psychological Factors (+1 each): Emotional during conversation  History of noncompliance/frequent no shows and cancellations: 2  Verbalizes the need for more education: 0  Navigation Acuity: 16     Follow Up  No follow-ups on file.     Met with patient today following education visit. She is accompanied by her daughter. Introduced self and role of navigation. Assessed for barriers to care. Patient reports that she does have anxiety and depression. She denies being suicidal. She is emotional during conversation today. Referral sent to [facility]. Her boyfriend is able to bring her to appointments but only on  and  when he does not have work. Message sent to  to try to accommodate this schedule. She does feel that she has a good support system. Patient has no further questions or concerns today. They are aware of how to reach navigation should they need to.

## 2024-12-12 NOTE — PROGRESS NOTES
Subjective:       Patient ID: Natasha Padilla is a 64 y.o. female.     Chief Complaint: Newly diagnosed rectal cancer    Diagnosis: Metastatic rectal cancer                    Stage IA right breast cancer 7/20 (T1b N0 m0) - 1.0 cm, GII, ER/HI+, Her-2 neg, Oncotype RS 17                    Locally advanced, neglected left breast cancer 8/12 - clinical stage IIIC (T4c N3b M0) - ER/HI neg,  Her-2 neg                    S/p bilateral mastectomies                         Postmenopausal with intact uetrus    Treatment History  DD AC x4 --> L mastectomy 1/15 --> Weekly Taxol x12 (5/15) --> XRT  Resection AMARILIS met 6/16  Letrozole 7/20 - 3/22    Current Treatment:  Patient education    Clinical History:  Patient was initially diagnosed with left breast cancer 8/12 by biopsy positive for infiltrating ductal carcinoma.  Pathology showed a grade 3 infiltrating ductal carcinoma, ER +1%, HI +83% and HER-2 equivocal at 2+.  She did not return for follow-up and did not receive any treatment at that time.  She presented with an enlarging left breast mass 10/14 and repeat biopsy at that time showed a grade 2 infiltrating ductal carcinoma that was triple negative for hormone receptor and HER-2 expression.  Metastatic workup was negative.  She received neoadjuvant chemotherapy followed by left mastectomy, adjuvant chemotherapy and radiation.  She had a solitary left upper lobe lung metastasis resected 6/16.  Pathology was consistent with metastatic breast cancer.  She had an early stage breast cancer on the right side with completely different pathology and underwent a right mastectomy followed by adjuvant hormonal therapy with an AI.  She was last seen in the office for follow-up 11/24/21 and did not return for additional visits.  She stopped taking her letrozole several months after her last appointment.  She was not seeing anyone for regular health maintenance and did not have a primary care provider.    She presented to the ER at  [facility] 10/21/24 after her family found her down on the bathroom floor at home.  She had right-sided weakness, affecting the arm more than the leg.  MRI of the brain showed small acute infarcts in the right cerebellum and left occipital lobes.  Admission H&H was 22 and 62.5 leading to a Hematology consult.  A bone marrow aspirate and biopsy was normal and she tested negative for a JAK2 mutation.  She was initiated on anticoagulation and developed rectal bleeding.  She reported a history of rectal bleeding intermittently for several months prior to admission.  Colonoscopy 11/5/24 showed a malignant appearing rectal mass 5-10 cm from the anal verge.  Multiple polyps were also removed.  Biopsy of the rectal mass was positive for invasive adenocarcinoma.      CT C/A/P 11/5/24:  Left apical scarring and a 2 mm nodule in the right mid lung.  There were multiple hepatic metastases, largest in the left lobe measured 3.9 x 3.1 cm.   MRI pelvis 11/6/24: T3 N1 rectal primary.    CT-guided liver biopsy 11/7/24:  Metastatic colorectal adenocarcinoma.  ECHO 10/20/24:  Global HK, EF 40-45%.  Mild-to-moderate MVR.    She was subsequently diagnosed with mitral valve endocarditis and 6 weeks of IV antibiotics were recommended.  She left the hospital AMA without scheduling any additional outpatient follow-up.    Dr. Marlow recommended palliative treatment of her metastatic rectal cancer with XELOX per NCCN guidelines.IV Oxaliplatin 130 mg/m2 D1 + Xeloda at a starting dose of 1000 mg b.i.d. D1-14 until tolerance can be established on a 21 day treatment cycle.  Her PICC line will remain in place for safe administration of IV chemotherapy.  If treatment was well tolerated, Bevacizumab would be added.    Interval History  She presents to the office today accompanied by her daughter for formal chemotherapy education.  She met with the nurse navigator today.  As outlined above, treatment was recommended with XELOX on a three week dosing  "schedule.  Plans are to begin treatment on 12/17 or 12/18/24.    Review of Systems   Constitutional:  Positive for activity change, appetite change, fatigue and unexpected weight change. Negative for fever.   HENT:  Negative for mouth sores, nosebleeds, sore throat and trouble swallowing.    Eyes: Negative.    Respiratory:  Negative for cough and shortness of breath.    Cardiovascular:  Negative for chest pain, palpitations and leg swelling.   Gastrointestinal:  Positive for blood in stool. Negative for abdominal distention, abdominal pain, change in bowel habit, constipation, diarrhea and nausea.   Genitourinary:  Negative for dysuria, flank pain, frequency and hematuria.   Musculoskeletal:  Positive for arthralgias. Negative for back pain.   Integumentary:  Negative for pallor and rash.   Neurological:  Positive for speech difficulty (mild), weakness and coordination difficulties. Negative for dizziness, numbness and headaches.   Hematological:  Negative for adenopathy. Does not bruise/bleed easily.   Psychiatric/Behavioral:  Positive for depressed mood. Negative for confusion and suicidal ideas. The patient is nervous/anxious.        PMHx:  CAD with stent, hyperlipidemia, carotid stenosis with right carotid stent, COPD, bilateral breast cancer, CVA, PVD, depression, alcohol abuse  PSHx:  Tonsils, left mastectomy 1/15, MediPort insertion and removal, jaw surgery, left hand surgery, AMARILIS wedge resection 6/16, right carotid stent, iliac artery stent, right mastectomy 7/20, bone marrow  SH:  Long-term smoker 1 ppd, quit 10/24.  Previous heavy alcohol use, quit 10/24.  Lives alone in [place], her daughter lives across the street.  FH:  Her sister had kidney cancer.  A paternal aunt had breast cancer and colon cancer, another paternal aunt had colon cancer.    Objective:        BP (!) 142/80   Pulse 71   Temp 97.7 °F (36.5 °C)   Resp 16   Ht 5' 4" (1.626 m)   Wt 53.9 kg (118 lb 14.4 oz)   BMI 20.41 kg/m²  "   Physical Exam  Constitutional:       Appearance: She is ill-appearing.   HENT:      Head: Normocephalic.      Mouth/Throat:      Mouth: Mucous membranes are moist.   Eyes:      General: No scleral icterus.     Extraocular Movements: Extraocular movements intact.      Conjunctiva/sclera: Conjunctivae normal.   Pulmonary:      Effort: Respiratory distress present.      Comments: Decreased breath sounds at the bases, otherwise clear  Chest:      Comments: Well-healed bilateral mastectomy incisions.  No suspicious masses, skin changes or axillary nodes bilaterally.  Musculoskeletal:         General: No swelling. Normal range of motion.      Cervical back: Normal range of motion and neck supple.   Lymphadenopathy:      Upper Body:      Right upper body: No supraclavicular or axillary adenopathy.      Left upper body: No supraclavicular or axillary adenopathy.   Skin:     General: Skin is warm and dry.      Findings: Bruising (scattered ecchymoses on arms) present. No rash.      Comments: PICC line LUE   Neurological:      Mental Status: She is alert and oriented to person, place, and time.      Cranial Nerves: No cranial nerve deficit.      Motor: Weakness (right-sided weakness, arm> leg) present.      Coordination: Coordination abnormal.   Psychiatric:         Mood and Affect: Mood normal.         Thought Content: Thought content normal.         Judgment: Judgment normal.       ECOG SCORE    2 - Capable of all selfcare but unable to carry out any work activities, active > 50% of hours          LABORATORY  Recent Results (from the past week)   Comprehensive Metabolic Panel    Collection Time: 12/06/24  1:15 PM   Result Value Ref Range    Sodium 147 (H) 136 - 145 mmol/L    Potassium 2.3 (LL) 3.5 - 5.1 mmol/L    Chloride 98 98 - 107 mmol/L    CO2 36 (H) 23 - 31 mmol/L    Glucose 75 (L) 82 - 115 mg/dL    Blood Urea Nitrogen 3.0 (L) 9.8 - 20.1 mg/dL    Creatinine 0.61 0.55 - 1.02 mg/dL    Calcium 7.6 (L) 8.4 - 10.2 mg/dL     Protein Total 5.3 (L) 5.8 - 7.6 gm/dL    Albumin 2.2 (L) 3.4 - 4.8 g/dL    Globulin 3.1 2.4 - 3.5 gm/dL    Albumin/Globulin Ratio 0.7 (L) 1.1 - 2.0 ratio    Bilirubin Total 0.7 <=1.5 mg/dL     40 - 150 unit/L    ALT 41 0 - 55 unit/L    AST 70 (H) 5 - 34 unit/L    eGFR >60 mL/min/1.73/m2    Anion Gap 13.0 mEq/L    BUN/Creatinine Ratio 5    Sedimentation rate    Collection Time: 12/06/24  1:15 PM   Result Value Ref Range    Sed Rate 28 (H) 0 - 20 mm/hr   C-Reactive Protein    Collection Time: 12/06/24  1:15 PM   Result Value Ref Range    CRP 17.40 (H) <5.00 mg/L   CBC with Differential    Collection Time: 12/06/24  1:15 PM   Result Value Ref Range    WBC 6.33 4.50 - 11.50 x10(3)/mcL    RBC 3.33 (L) 4.20 - 5.40 x10(6)/mcL    Hgb 10.9 (L) 12.0 - 16.0 g/dL    Hct 30.8 (L) 37.0 - 47.0 %    MCV 92.5 80.0 - 94.0 fL    MCH 32.7 (H) 27.0 - 31.0 pg    MCHC 35.4 33.0 - 36.0 g/dL    RDW 13.5 11.5 - 17.0 %    Platelet 230 130 - 400 x10(3)/mcL    MPV 12.3 (H) 7.4 - 10.4 fL    Neut % 70.1 %    Lymph % 16.9 %    Mono % 9.3 %    Eos % 2.8 %    Basophil % 0.6 %    Lymph # 1.07 0.6 - 4.6 x10(3)/mcL    Neut # 4.43 2.1 - 9.2 x10(3)/mcL    Mono # 0.59 0.1 - 1.3 x10(3)/mcL    Eos # 0.18 0 - 0.9 x10(3)/mcL    Baso # 0.04 <=0.2 x10(3)/mcL    IG# 0.02 0 - 0.04 x10(3)/mcL    IG% 0.3 %   Comprehensive Metabolic Panel    Collection Time: 12/09/24  2:25 PM   Result Value Ref Range    Sodium 146 (H) 136 - 145 mmol/L    Potassium 2.1 (LL) 3.5 - 5.1 mmol/L    Chloride 99 98 - 107 mmol/L    CO2 34 (H) 23 - 31 mmol/L    Glucose 86 82 - 115 mg/dL    Blood Urea Nitrogen 3.3 (L) 9.8 - 20.1 mg/dL    Creatinine 0.66 0.55 - 1.02 mg/dL    Calcium 8.0 (L) 8.4 - 10.2 mg/dL    Protein Total 6.0 5.8 - 7.6 gm/dL    Albumin 2.3 (L) 3.4 - 4.8 g/dL    Globulin 3.7 (H) 2.4 - 3.5 gm/dL    Albumin/Globulin Ratio 0.6 (L) 1.1 - 2.0 ratio    Bilirubin Total 0.7 <=1.5 mg/dL     40 - 150 unit/L    ALT 51 0 - 55 unit/L    AST 91 (H) 5 - 34 unit/L    eGFR >60  mL/min/1.73/m2    Anion Gap 13.0 mEq/L    BUN/Creatinine Ratio 5    CEA    Collection Time: 12/09/24  2:25 PM   Result Value Ref Range    Carcinoembryonic Antigen 63.88 (H) 0.00 - 3.00 ng/mL   Magnesium    Collection Time: 12/09/24  2:25 PM   Result Value Ref Range    Magnesium Level 1.70 1.60 - 2.60 mg/dL   CBC with Differential    Collection Time: 12/09/24  2:25 PM   Result Value Ref Range    WBC 6.05 4.50 - 11.50 x10(3)/mcL    RBC 3.47 (L) 4.20 - 5.40 x10(6)/mcL    Hgb 11.3 (L) 12.0 - 16.0 g/dL    Hct 33.0 (L) 37.0 - 47.0 %    MCV 95.1 (H) 80.0 - 94.0 fL    MCH 32.6 (H) 27.0 - 31.0 pg    MCHC 34.2 33.0 - 36.0 g/dL    RDW 13.8 11.5 - 17.0 %    Platelet 216 130 - 400 x10(3)/mcL    MPV 10.4 7.4 - 10.4 fL    Neut % 69.0 %    Lymph % 19.0 %    Mono % 8.6 %    Eos % 2.5 %    Basophil % 0.7 %    Lymph # 1.15 0.6 - 4.6 x10(3)/mcL    Neut # 4.18 2.1 - 9.2 x10(3)/mcL    Mono # 0.52 0.1 - 1.3 x10(3)/mcL    Eos # 0.15 0 - 0.9 x10(3)/mcL    Baso # 0.04 <=0.2 x10(3)/mcL    IG# 0.01 0 - 0.04 x10(3)/mcL    IG% 0.2 %          Assessment:   Metastatic rectal cancer  Recent CVA with right-sided hemiparesis  Mitral valve endocarditis - completing 6 weeks of IV antibiotics  Malnutrition and hypoalbuminemia  Mild anemia  Hypokalemia  Stage IA right breast cancer 7/20 - ANNA  Locally advanced, neglected left breast cancer 8/12 - ANNA  S/p bilateral mastectomies  Postmenopausal with intact uterus      Plan:   Per NCCN guidelines, treatment was recommended with XELOX/CAPOX.  IV Oxaliplatin 130 mg/m2 D1, Pre-meds Benadryl 25 mg IV, Alox 0.25 mg IV, Dexamethasone 12 mg IV  PO Xeloda 1000 mg BID days 1-14, followed by a 7 day break.  Zofran 8 mg PO BID on days 2 & 3, and every 8 hours PRN.    Patient educated on the expected side effects of the above regimen, included but not limited to low blood counts, hair loss, infusion reaction, temperature sensitivity, peripheral neuropathy, mucositis, rash, hand and foot syndrome, decreased appetite,  taste changes, N/V/D/C, fatigue, weakness.  Written literature on all potential side effects provided.    Side effect management specific to N/V/C/D constipation outlined.  Written instructions provided.      Prescription for Zofran 8 mg PO BID on post chemo days 2 & 3 and Q8PRN provided.    Patient to hold Xeloda and contact the office with intractable N/V/D, Inability to eat/drink, fever.    Follow-up appointment with Dr. Marlow on 12/31/24 at 9am.      Patient to call with questions or concerns in the interim.    Total face to face time with patient and her daughter was 35 minutes.    All questions answered to the satisfaction of the patient.    SOURAV CLEMENTE, FNP-C  [facility]     Other Physicians  Dr. Marisa Parham

## 2024-12-18 ENCOUNTER — INFUSION (OUTPATIENT)
Dept: INFUSION THERAPY | Facility: HOSPITAL | Age: 64
End: 2024-12-18
Attending: INTERNAL MEDICINE
Payer: MEDICARE

## 2024-12-18 VITALS
SYSTOLIC BLOOD PRESSURE: 118 MMHG | RESPIRATION RATE: 16 BRPM | HEART RATE: 93 BPM | DIASTOLIC BLOOD PRESSURE: 84 MMHG | TEMPERATURE: 98 F | OXYGEN SATURATION: 100 %

## 2024-12-18 DIAGNOSIS — C78.7 SECONDARY MALIGNANT NEOPLASM OF LIVER: Primary | ICD-10-CM

## 2024-12-18 DIAGNOSIS — C20 RECTAL CANCER: ICD-10-CM

## 2024-12-18 PROCEDURE — 25000003 PHARM REV CODE 250: Performed by: INTERNAL MEDICINE

## 2024-12-18 PROCEDURE — 96367 TX/PROPH/DG ADDL SEQ IV INF: CPT

## 2024-12-18 PROCEDURE — 96415 CHEMO IV INFUSION ADDL HR: CPT

## 2024-12-18 PROCEDURE — 96413 CHEMO IV INFUSION 1 HR: CPT

## 2024-12-18 PROCEDURE — 96375 TX/PRO/DX INJ NEW DRUG ADDON: CPT

## 2024-12-18 PROCEDURE — 63600175 PHARM REV CODE 636 W HCPCS: Performed by: INTERNAL MEDICINE

## 2024-12-18 RX ORDER — DIPHENHYDRAMINE HYDROCHLORIDE 50 MG/ML
25 INJECTION INTRAMUSCULAR; INTRAVENOUS
Status: COMPLETED | OUTPATIENT
Start: 2024-12-18 | End: 2024-12-18

## 2024-12-18 RX ORDER — DIPHENHYDRAMINE HYDROCHLORIDE 50 MG/ML
50 INJECTION INTRAMUSCULAR; INTRAVENOUS ONCE AS NEEDED
Status: DISCONTINUED | OUTPATIENT
Start: 2024-12-18 | End: 2024-12-18 | Stop reason: HOSPADM

## 2024-12-18 RX ORDER — EPINEPHRINE 0.3 MG/.3ML
0.3 INJECTION SUBCUTANEOUS ONCE AS NEEDED
Status: DISCONTINUED | OUTPATIENT
Start: 2024-12-18 | End: 2024-12-18 | Stop reason: HOSPADM

## 2024-12-18 RX ORDER — SODIUM CHLORIDE 0.9 % (FLUSH) 0.9 %
10 SYRINGE (ML) INJECTION
Status: DISCONTINUED | OUTPATIENT
Start: 2024-12-18 | End: 2024-12-18 | Stop reason: HOSPADM

## 2024-12-18 RX ORDER — HEPARIN 100 UNIT/ML
500 SYRINGE INTRAVENOUS
Status: DISCONTINUED | OUTPATIENT
Start: 2024-12-18 | End: 2024-12-18 | Stop reason: HOSPADM

## 2024-12-18 RX ADMIN — DEXTROSE MONOHYDRATE: 50 INJECTION, SOLUTION INTRAVENOUS at 10:12

## 2024-12-18 RX ADMIN — OXALIPLATIN 200 MG: 5 INJECTION, SOLUTION INTRAVENOUS at 11:12

## 2024-12-18 RX ADMIN — DIPHENHYDRAMINE HYDROCHLORIDE 25 MG: 50 INJECTION INTRAMUSCULAR; INTRAVENOUS at 10:12

## 2024-12-18 RX ADMIN — PALONOSETRON HYDROCHLORIDE 0.25 MG: 0.25 INJECTION INTRAVENOUS at 10:12

## 2024-12-19 NOTE — PROGRESS NOTES
Subjective:       Patient ID: Natasha Padilla is a 64 y.o. female.     Chief Complaint:  Weight loss    Diagnosis: Metastatic rectal cancer                    Stage IA right breast cancer 7/20 (T1b N0 m0) - 1.0 cm, GII, ER/MA+, Her-2 neg, Oncotype RS 17                    Locally advanced, neglected left breast cancer 8/12 - clinical stage IIIC (T4c N3b M0) - ER/MA neg,  Her-2 neg                    S/p bilateral mastectomies                         Postmenopausal with intact uetrus    Treatment History  DD AC x4 --> L mastectomy 1/15 --> Weekly Taxol x12 (5/15) --> XRT  Resection AMARILIS met 6/16  Letrozole 7/20 - 3/22    Current Treatment: Oxaliplatin/Xeloda s/p 1 cycle (12/18/24)    Clinical History:  Patient was initially diagnosed with left breast cancer 8/12 by biopsy positive for infiltrating ductal carcinoma.  Pathology showed a grade 3 infiltrating ductal carcinoma, ER +1%, MA +83% and HER-2 equivocal at 2+.  She did not return for follow-up and did not receive any treatment at that time.  She presented with an enlarging left breast mass 10/14 and repeat biopsy at that time showed a grade 2 infiltrating ductal carcinoma that was triple negative for hormone receptor and HER-2 expression.  Metastatic workup was negative.  She received neoadjuvant chemotherapy followed by left mastectomy, adjuvant chemotherapy and radiation.  She had a solitary left upper lobe lung metastasis resected 6/16.  Pathology was consistent with metastatic breast cancer.  She had an early stage breast cancer on the right side with completely different pathology and underwent a right mastectomy followed by adjuvant hormonal therapy with an AI.  She was last seen in the office for follow-up 11/24/21 and did not return for additional visits.  She stopped taking her letrozole several months after her last appointment.  She was not seeing anyone for regular health maintenance and did not have a primary care provider.    She presented to the ER  at [facility] 10/21/24 after her family found her down on the bathroom floor at home.  She had right-sided weakness, affecting the arm more than the leg.  MRI of the brain showed small acute infarcts in the right cerebellum and left occipital lobes.  Admission H&H was 22 and 62.5 leading to a Hematology consult.  A bone marrow aspirate and biopsy was normal and she tested negative for a JAK2 mutation.  She was initiated on anticoagulation and developed rectal bleeding.  She reported a history of rectal bleeding intermittently for several months prior to admission.  Colonoscopy 11/5/24 showed a malignant appearing rectal mass 5-10 cm from the anal verge.  Multiple polyps were also removed.  Biopsy of the rectal mass was positive for invasive adenocarcinoma.      CT C/A/P 11/5/24:  Left apical scarring and a 2 mm nodule in the right mid lung.  There were multiple hepatic metastases, largest in the left lobe measured 3.9 x 3.1 cm.   MRI pelvis 11/6/24: T3 N1 rectal primary.    CT-guided liver biopsy 11/7/24:  Metastatic colorectal adenocarcinoma.  ECHO 10/20/24:  Global HK, EF 40-45%.  Mild-to-moderate MVR.    She was subsequently diagnosed with mitral valve endocarditis and 6 weeks of IV antibiotics were recommended.  She left the hospital AMA without scheduling any additional outpatient follow-up.  Home health was put in place and she completed 6 weeks of IV Unasyn 12/09/24.  Palliative chemotherapy was recommended with IV Oxaliplatin and oral Xeloda.  She received her 1st cycle on 12/18/24.    Interval History  She returns to the office today for a three-week follow-up visit accompanied by her daughter.  She is ambulatory with a rolling walker.  She is still being followed by home health along with home PT and OT.  She has completed 1 cycle of palliative chemotherapy with IV oxaliplatin and oral Xeloda.  Xeloda was dosed at 1000 mg b.i.d. on days 1 through 14 for her 1st cycle.  She did not have any significant mucositis,  diarrhea or hand-foot syndrome.  Her appetite has been poor and she is lost additional weight.  She reports no changes in her bowel habits.  She has not noted any further rectal bleeding.  No abdominal pain or significant nausea.    Review of Systems   Constitutional:  Positive for activity change, appetite change, fatigue and unexpected weight change. Negative for fever.   HENT:  Negative for mouth sores, nosebleeds, sore throat and trouble swallowing.    Eyes: Negative.    Respiratory:  Negative for cough and shortness of breath.    Cardiovascular:  Negative for chest pain, palpitations and leg swelling.   Gastrointestinal:  Negative for abdominal distention, abdominal pain, blood in stool, change in bowel habit, constipation, diarrhea and nausea.   Genitourinary:  Negative for dysuria, flank pain, frequency and hematuria.   Musculoskeletal:  Positive for arthralgias. Negative for back pain.   Integumentary:  Negative for pallor and rash.   Neurological:  Positive for speech difficulty (mild), weakness and coordination difficulties. Negative for dizziness, numbness and headaches.   Hematological:  Negative for adenopathy. Does not bruise/bleed easily.   Psychiatric/Behavioral:  Positive for depressed mood. Negative for confusion and suicidal ideas. The patient is nervous/anxious.        PMHx:  CAD with stent, hyperlipidemia, carotid stenosis with right carotid stent, COPD, bilateral breast cancer, CVA, PVD, depression, alcohol abuse  PSHx:  Tonsils, left mastectomy 1/15, MediPort insertion and removal, jaw surgery, left hand surgery, AMARILIS wedge resection 6/16, right carotid stent, iliac artery stent, right mastectomy 7/20, bone marrow  SH:  Long-term smoker 1 ppd, quit 10/24.  Previous heavy alcohol use, quit 10/24.  Lives alone in [place], her daughter lives across the street.  FH:  Her sister had kidney cancer.  A paternal aunt had breast cancer and colon cancer, another paternal aunt had colon  "cancer.    Objective:        /74   Pulse 91   Temp 98.3 °F (36.8 °C)   Resp 15   Ht 5' 4" (1.626 m)   Wt 47.6 kg (105 lb)   BMI 18.02 kg/m²    Physical Exam  Constitutional:       Comments: Thin, ill-appearing WF in NAD, ambulatory with a rolling walker   HENT:      Head: Normocephalic.      Mouth/Throat:      Mouth: Mucous membranes are moist.      Pharynx: Oropharynx is clear. No posterior oropharyngeal erythema.   Eyes:      General: No scleral icterus.     Extraocular Movements: Extraocular movements intact.      Pupils: Pupils are equal, round, and reactive to light.   Cardiovascular:      Rate and Rhythm: Normal rate and regular rhythm.      Heart sounds: No murmur heard.  Pulmonary:      Comments: Decreased breath sounds at the bases, otherwise clear  Chest:      Comments: Well-healed bilateral mastectomy incisions.  No suspicious masses, skin changes or axillary nodes bilaterally.  Abdominal:      General: Bowel sounds are normal. There is no distension.      Palpations: Abdomen is soft. There is no mass.      Tenderness: There is no abdominal tenderness.      Comments: No palpable masses or organomegaly.   Musculoskeletal:         General: No swelling or tenderness. Normal range of motion.      Cervical back: Neck supple. No tenderness.   Lymphadenopathy:      Upper Body:      Right upper body: No supraclavicular or axillary adenopathy.      Left upper body: No supraclavicular or axillary adenopathy.   Skin:     General: Skin is warm and dry.      Findings: Bruising (scattered ecchymoses on arms) present. No rash.      Comments: PICC line LUE.  No hand-foot syndrome.   Neurological:      Mental Status: She is alert and oriented to person, place, and time.      Cranial Nerves: No cranial nerve deficit.      Motor: Weakness (right-sided weakness, arm> leg) present.      Coordination: Coordination abnormal.   Psychiatric:         Mood and Affect: Mood normal.         Thought Content: Thought " content normal.         Judgment: Judgment normal.       ECOG SCORE    2 - Capable of all selfcare but unable to carry out any work activities, active > 50% of hours          LABORATORY  Recent Results (from the past week)   Comprehensive Metabolic Panel    Collection Time: 12/31/24  8:41 AM   Result Value Ref Range    Sodium 146 (H) 136 - 145 mmol/L    Potassium 4.5 3.5 - 5.1 mmol/L    Chloride 104 98 - 107 mmol/L    CO2 28 23 - 31 mmol/L    Glucose 91 82 - 115 mg/dL    Blood Urea Nitrogen 7.4 (L) 9.8 - 20.1 mg/dL    Creatinine 0.78 0.55 - 1.02 mg/dL    Calcium 9.3 8.4 - 10.2 mg/dL    Protein Total 6.6 5.8 - 7.6 gm/dL    Albumin 2.6 (L) 3.4 - 4.8 g/dL    Globulin 4.0 (H) 2.4 - 3.5 gm/dL    Albumin/Globulin Ratio 0.7 (L) 1.1 - 2.0 ratio    Bilirubin Total 0.5 <=1.5 mg/dL     (H) 40 - 150 unit/L    ALT 98 (H) 0 - 55 unit/L     (H) 5 - 34 unit/L    eGFR >60 mL/min/1.73/m2    Anion Gap 14.0 mEq/L    BUN/Creatinine Ratio 9    CBC with Differential    Collection Time: 12/31/24  8:41 AM   Result Value Ref Range    WBC 5.38 4.50 - 11.50 x10(3)/mcL    RBC 4.12 (L) 4.20 - 5.40 x10(6)/mcL    Hgb 13.6 12.0 - 16.0 g/dL    Hct 40.9 37.0 - 47.0 %    MCV 99.3 (H) 80.0 - 94.0 fL    MCH 33.0 (H) 27.0 - 31.0 pg    MCHC 33.3 33.0 - 36.0 g/dL    RDW 14.4 11.5 - 17.0 %    Platelet 279 130 - 400 x10(3)/mcL    MPV 10.8 (H) 7.4 - 10.4 fL    Neut % 57.4 %    Lymph % 28.1 %    Mono % 12.5 %    Eos % 0.9 %    Basophil % 0.7 %    Lymph # 1.51 0.6 - 4.6 x10(3)/mcL    Neut # 3.09 2.1 - 9.2 x10(3)/mcL    Mono # 0.67 0.1 - 1.3 x10(3)/mcL    Eos # 0.05 0 - 0.9 x10(3)/mcL    Baso # 0.04 <=0.2 x10(3)/mcL    IG# 0.02 0 - 0.04 x10(3)/mcL    IG% 0.4 %         Assessment:   Metastatic rectal cancer  Recent CVA with right-sided hemiparesis  Mitral valve endocarditis - s/p 6 weeks of IV antibiotics (completed 12/09/24)  Malnutrition and hypoalbuminemia  Mild anemia - improved  Stage IA right breast cancer 7/20 - ANNA  Locally advanced,  neglected left breast cancer 8/12 - ANNA  S/p bilateral mastectomies  Postmenopausal with intact uterus      Plan:   She tolerated her first cycle of palliative chemotherapy well without significant toxicity.  She will proceed with her 2nd cycle of therapy on 1/08/25.  Xeloda will be increased to 1000 mg in the a.m. and 1500 mg in the p.m. on days 1 through 14 with her 2nd cycle.  Start Megace liquid 800 mg daily for appetite stimulation.  RTC in 3 weeks for a follow-up visit and clinical assessment with laboratory prior to her next cycle of therapy.  Consider adding Bevacizumab if her performance status improves.      KARLA PARKS MD    Other Physicians  Dr. Marisa Vang

## 2024-12-26 ENCOUNTER — TELEPHONE (OUTPATIENT)
Dept: HEMATOLOGY/ONCOLOGY | Facility: CLINIC | Age: 64
End: 2024-12-26
Payer: MEDICARE

## 2024-12-26 NOTE — TELEPHONE ENCOUNTER
Received referral, reviewed chart and attempted to contact patient. Left a message requesting a return call.

## 2024-12-31 ENCOUNTER — OFFICE VISIT (OUTPATIENT)
Dept: HEMATOLOGY/ONCOLOGY | Facility: CLINIC | Age: 64
End: 2024-12-31
Payer: MEDICARE

## 2024-12-31 ENCOUNTER — LAB VISIT (OUTPATIENT)
Dept: LAB | Facility: HOSPITAL | Age: 64
End: 2024-12-31
Attending: INTERNAL MEDICINE
Payer: MEDICARE

## 2024-12-31 VITALS
TEMPERATURE: 98 F | SYSTOLIC BLOOD PRESSURE: 116 MMHG | HEART RATE: 91 BPM | DIASTOLIC BLOOD PRESSURE: 74 MMHG | RESPIRATION RATE: 15 BRPM | WEIGHT: 105 LBS | HEIGHT: 64 IN | BODY MASS INDEX: 17.93 KG/M2

## 2024-12-31 DIAGNOSIS — C20 RECTAL CANCER: Primary | ICD-10-CM

## 2024-12-31 DIAGNOSIS — C20 RECTAL CANCER: ICD-10-CM

## 2024-12-31 DIAGNOSIS — C50.411 MALIGNANT NEOPLASM OF UPPER-OUTER QUADRANT OF RIGHT BREAST IN FEMALE, ESTROGEN RECEPTOR POSITIVE: ICD-10-CM

## 2024-12-31 DIAGNOSIS — C78.7 SECONDARY MALIGNANT NEOPLASM OF LIVER: ICD-10-CM

## 2024-12-31 DIAGNOSIS — Z17.0 MALIGNANT NEOPLASM OF UPPER-OUTER QUADRANT OF RIGHT BREAST IN FEMALE, ESTROGEN RECEPTOR POSITIVE: ICD-10-CM

## 2024-12-31 DIAGNOSIS — R64 CANCER CACHEXIA: ICD-10-CM

## 2024-12-31 LAB
ALBUMIN SERPL-MCNC: 2.6 G/DL (ref 3.4–4.8)
ALBUMIN/GLOB SERPL: 0.7 RATIO (ref 1.1–2)
ALP SERPL-CCNC: 188 UNIT/L (ref 40–150)
ALT SERPL-CCNC: 98 UNIT/L (ref 0–55)
ANION GAP SERPL CALC-SCNC: 14 MEQ/L
AST SERPL-CCNC: 107 UNIT/L (ref 5–34)
BASOPHILS # BLD AUTO: 0.04 X10(3)/MCL
BASOPHILS NFR BLD AUTO: 0.7 %
BILIRUB SERPL-MCNC: 0.5 MG/DL
BUN SERPL-MCNC: 7.4 MG/DL (ref 9.8–20.1)
CALCIUM SERPL-MCNC: 9.3 MG/DL (ref 8.4–10.2)
CHLORIDE SERPL-SCNC: 104 MMOL/L (ref 98–107)
CO2 SERPL-SCNC: 28 MMOL/L (ref 23–31)
CREAT SERPL-MCNC: 0.78 MG/DL (ref 0.55–1.02)
CREAT/UREA NIT SERPL: 9
EOSINOPHIL # BLD AUTO: 0.05 X10(3)/MCL (ref 0–0.9)
EOSINOPHIL NFR BLD AUTO: 0.9 %
ERYTHROCYTE [DISTWIDTH] IN BLOOD BY AUTOMATED COUNT: 14.4 % (ref 11.5–17)
GFR SERPLBLD CREATININE-BSD FMLA CKD-EPI: >60 ML/MIN/1.73/M2
GLOBULIN SER-MCNC: 4 GM/DL (ref 2.4–3.5)
GLUCOSE SERPL-MCNC: 91 MG/DL (ref 82–115)
HCT VFR BLD AUTO: 40.9 % (ref 37–47)
HGB BLD-MCNC: 13.6 G/DL (ref 12–16)
IMM GRANULOCYTES # BLD AUTO: 0.02 X10(3)/MCL (ref 0–0.04)
IMM GRANULOCYTES NFR BLD AUTO: 0.4 %
LYMPHOCYTES # BLD AUTO: 1.51 X10(3)/MCL (ref 0.6–4.6)
LYMPHOCYTES NFR BLD AUTO: 28.1 %
MCH RBC QN AUTO: 33 PG (ref 27–31)
MCHC RBC AUTO-ENTMCNC: 33.3 G/DL (ref 33–36)
MCV RBC AUTO: 99.3 FL (ref 80–94)
MONOCYTES # BLD AUTO: 0.67 X10(3)/MCL (ref 0.1–1.3)
MONOCYTES NFR BLD AUTO: 12.5 %
NEUTROPHILS # BLD AUTO: 3.09 X10(3)/MCL (ref 2.1–9.2)
NEUTROPHILS NFR BLD AUTO: 57.4 %
PLATELET # BLD AUTO: 279 X10(3)/MCL (ref 130–400)
PMV BLD AUTO: 10.8 FL (ref 7.4–10.4)
POTASSIUM SERPL-SCNC: 4.5 MMOL/L (ref 3.5–5.1)
PROT SERPL-MCNC: 6.6 GM/DL (ref 5.8–7.6)
RBC # BLD AUTO: 4.12 X10(6)/MCL (ref 4.2–5.4)
SODIUM SERPL-SCNC: 146 MMOL/L (ref 136–145)
WBC # BLD AUTO: 5.38 X10(3)/MCL (ref 4.5–11.5)

## 2024-12-31 PROCEDURE — 99213 OFFICE O/P EST LOW 20 MIN: CPT | Mod: PBBFAC | Performed by: INTERNAL MEDICINE

## 2024-12-31 PROCEDURE — 99999 PR PBB SHADOW E&M-EST. PATIENT-LVL III: CPT | Mod: PBBFAC,,, | Performed by: INTERNAL MEDICINE

## 2024-12-31 PROCEDURE — 80053 COMPREHEN METABOLIC PANEL: CPT

## 2024-12-31 PROCEDURE — 85025 COMPLETE CBC W/AUTO DIFF WBC: CPT

## 2024-12-31 PROCEDURE — 36415 COLL VENOUS BLD VENIPUNCTURE: CPT

## 2024-12-31 PROCEDURE — 99214 OFFICE O/P EST MOD 30 MIN: CPT | Mod: S$PBB,,, | Performed by: INTERNAL MEDICINE

## 2024-12-31 RX ORDER — MEGESTROL ACETATE 40 MG/ML
200 SUSPENSION ORAL DAILY
Qty: 480 ML | Refills: 3 | Status: SHIPPED | OUTPATIENT
Start: 2024-12-31 | End: 2025-03-31

## 2024-12-31 RX ORDER — CAPECITABINE 500 MG/1
TABLET, FILM COATED ORAL
Qty: 70 TABLET | Refills: 4 | Status: SHIPPED | OUTPATIENT
Start: 2024-12-31

## 2025-01-02 ENCOUNTER — TELEPHONE (OUTPATIENT)
Dept: HEMATOLOGY/ONCOLOGY | Facility: CLINIC | Age: 65
End: 2025-01-02
Payer: MEDICARE

## 2025-01-02 DIAGNOSIS — C20 RECTAL CANCER: Primary | ICD-10-CM

## 2025-01-02 RX ORDER — SODIUM CHLORIDE 0.9 % (FLUSH) 0.9 %
10 SYRINGE (ML) INJECTION WEEKLY
OUTPATIENT
Start: 2025-01-09

## 2025-01-02 NOTE — TELEPHONE ENCOUNTER
Martine with [facility] left message that patient certification will be ending. We will need to set patient up here for weekly picc care and flushes.

## 2025-01-05 RX ORDER — DIPHENHYDRAMINE HYDROCHLORIDE 50 MG/ML
25 INJECTION INTRAMUSCULAR; INTRAVENOUS
Status: CANCELLED
Start: 2025-01-08

## 2025-01-05 RX ORDER — HEPARIN 100 UNIT/ML
500 SYRINGE INTRAVENOUS
Status: CANCELLED | OUTPATIENT
Start: 2025-01-08

## 2025-01-05 RX ORDER — SODIUM CHLORIDE 0.9 % (FLUSH) 0.9 %
10 SYRINGE (ML) INJECTION
Status: CANCELLED | OUTPATIENT
Start: 2025-01-08

## 2025-01-05 RX ORDER — EPINEPHRINE 0.3 MG/.3ML
0.3 INJECTION SUBCUTANEOUS ONCE AS NEEDED
Status: CANCELLED | OUTPATIENT
Start: 2025-01-08

## 2025-01-05 RX ORDER — DIPHENHYDRAMINE HYDROCHLORIDE 50 MG/ML
50 INJECTION INTRAMUSCULAR; INTRAVENOUS ONCE AS NEEDED
Status: CANCELLED | OUTPATIENT
Start: 2025-01-08

## 2025-01-06 ENCOUNTER — TELEPHONE (OUTPATIENT)
Dept: NUTRITION | Facility: CLINIC | Age: 65
End: 2025-01-06
Payer: MEDICARE

## 2025-01-06 NOTE — TELEPHONE ENCOUNTER
Left voicemail with patient regarding nutrition audio only visit. Left RD callback number for her to reschedule or call with questions.   Signature: Filomena Wong, MPH, RD, LDN

## 2025-01-08 ENCOUNTER — CLINICAL SUPPORT (OUTPATIENT)
Dept: HEMATOLOGY/ONCOLOGY | Facility: CLINIC | Age: 65
End: 2025-01-08
Payer: MEDICARE

## 2025-01-08 ENCOUNTER — INFUSION (OUTPATIENT)
Dept: INFUSION THERAPY | Facility: HOSPITAL | Age: 65
End: 2025-01-08
Attending: INTERNAL MEDICINE
Payer: MEDICARE

## 2025-01-08 ENCOUNTER — LAB VISIT (OUTPATIENT)
Dept: LAB | Facility: HOSPITAL | Age: 65
End: 2025-01-08
Attending: INTERNAL MEDICINE
Payer: MEDICARE

## 2025-01-08 VITALS
HEART RATE: 100 BPM | DIASTOLIC BLOOD PRESSURE: 71 MMHG | RESPIRATION RATE: 18 BRPM | TEMPERATURE: 99 F | OXYGEN SATURATION: 100 % | BODY MASS INDEX: 17.61 KG/M2 | SYSTOLIC BLOOD PRESSURE: 122 MMHG | HEIGHT: 64 IN | WEIGHT: 103.13 LBS

## 2025-01-08 DIAGNOSIS — R45.89 ANXIETY ABOUT HEALTH: Primary | ICD-10-CM

## 2025-01-08 DIAGNOSIS — C78.7 SECONDARY MALIGNANT NEOPLASM OF LIVER: Primary | ICD-10-CM

## 2025-01-08 DIAGNOSIS — C20 RECTAL CANCER: ICD-10-CM

## 2025-01-08 DIAGNOSIS — C78.7 SECONDARY MALIGNANT NEOPLASM OF LIVER: ICD-10-CM

## 2025-01-08 LAB
ALBUMIN SERPL-MCNC: 2.7 G/DL (ref 3.4–4.8)
ALBUMIN/GLOB SERPL: 0.7 RATIO (ref 1.1–2)
ALP SERPL-CCNC: 197 UNIT/L (ref 40–150)
ALT SERPL-CCNC: 55 UNIT/L (ref 0–55)
ANION GAP SERPL CALC-SCNC: 11 MEQ/L
AST SERPL-CCNC: 75 UNIT/L (ref 5–34)
BASOPHILS # BLD AUTO: 0.05 X10(3)/MCL
BASOPHILS NFR BLD AUTO: 1 %
BILIRUB SERPL-MCNC: 0.6 MG/DL
BUN SERPL-MCNC: 10.4 MG/DL (ref 9.8–20.1)
CALCIUM SERPL-MCNC: 9.4 MG/DL (ref 8.4–10.2)
CHLORIDE SERPL-SCNC: 104 MMOL/L (ref 98–107)
CO2 SERPL-SCNC: 29 MMOL/L (ref 23–31)
CREAT SERPL-MCNC: 0.75 MG/DL (ref 0.55–1.02)
CREAT/UREA NIT SERPL: 14
EOSINOPHIL # BLD AUTO: 0.05 X10(3)/MCL (ref 0–0.9)
EOSINOPHIL NFR BLD AUTO: 1 %
ERYTHROCYTE [DISTWIDTH] IN BLOOD BY AUTOMATED COUNT: 16.4 % (ref 11.5–17)
GFR SERPLBLD CREATININE-BSD FMLA CKD-EPI: >60 ML/MIN/1.73/M2
GLOBULIN SER-MCNC: 4 GM/DL (ref 2.4–3.5)
GLUCOSE SERPL-MCNC: 86 MG/DL (ref 82–115)
HCT VFR BLD AUTO: 41.1 % (ref 37–47)
HGB BLD-MCNC: 13.8 G/DL (ref 12–16)
IMM GRANULOCYTES # BLD AUTO: 0.01 X10(3)/MCL (ref 0–0.04)
IMM GRANULOCYTES NFR BLD AUTO: 0.2 %
LYMPHOCYTES # BLD AUTO: 1.38 X10(3)/MCL (ref 0.6–4.6)
LYMPHOCYTES NFR BLD AUTO: 28.1 %
MCH RBC QN AUTO: 33.8 PG (ref 27–31)
MCHC RBC AUTO-ENTMCNC: 33.6 G/DL (ref 33–36)
MCV RBC AUTO: 100.7 FL (ref 80–94)
MONOCYTES # BLD AUTO: 0.58 X10(3)/MCL (ref 0.1–1.3)
MONOCYTES NFR BLD AUTO: 11.8 %
NEUTROPHILS # BLD AUTO: 2.84 X10(3)/MCL (ref 2.1–9.2)
NEUTROPHILS NFR BLD AUTO: 57.9 %
PLATELET # BLD AUTO: 306 X10(3)/MCL (ref 130–400)
PMV BLD AUTO: 9.9 FL (ref 7.4–10.4)
POTASSIUM SERPL-SCNC: 4.9 MMOL/L (ref 3.5–5.1)
PROT SERPL-MCNC: 6.7 GM/DL (ref 5.8–7.6)
RBC # BLD AUTO: 4.08 X10(6)/MCL (ref 4.2–5.4)
SODIUM SERPL-SCNC: 144 MMOL/L (ref 136–145)
WBC # BLD AUTO: 4.91 X10(3)/MCL (ref 4.5–11.5)

## 2025-01-08 PROCEDURE — 85025 COMPLETE CBC W/AUTO DIFF WBC: CPT

## 2025-01-08 PROCEDURE — 96413 CHEMO IV INFUSION 1 HR: CPT

## 2025-01-08 PROCEDURE — 36415 COLL VENOUS BLD VENIPUNCTURE: CPT

## 2025-01-08 PROCEDURE — 96367 TX/PROPH/DG ADDL SEQ IV INF: CPT

## 2025-01-08 PROCEDURE — 80053 COMPREHEN METABOLIC PANEL: CPT

## 2025-01-08 PROCEDURE — 96415 CHEMO IV INFUSION ADDL HR: CPT

## 2025-01-08 PROCEDURE — 99499 UNLISTED E&M SERVICE: CPT | Mod: S$PBB,,, | Performed by: SOCIAL WORKER

## 2025-01-08 PROCEDURE — 63600175 PHARM REV CODE 636 W HCPCS: Performed by: INTERNAL MEDICINE

## 2025-01-08 PROCEDURE — 96375 TX/PRO/DX INJ NEW DRUG ADDON: CPT

## 2025-01-08 PROCEDURE — 25000003 PHARM REV CODE 250: Performed by: INTERNAL MEDICINE

## 2025-01-08 RX ORDER — EPINEPHRINE 0.3 MG/.3ML
0.3 INJECTION SUBCUTANEOUS ONCE AS NEEDED
Status: DISCONTINUED | OUTPATIENT
Start: 2025-01-08 | End: 2025-01-08 | Stop reason: HOSPADM

## 2025-01-08 RX ORDER — DIPHENHYDRAMINE HYDROCHLORIDE 50 MG/ML
25 INJECTION INTRAMUSCULAR; INTRAVENOUS
Status: COMPLETED | OUTPATIENT
Start: 2025-01-08 | End: 2025-01-08

## 2025-01-08 RX ORDER — SODIUM CHLORIDE 0.9 % (FLUSH) 0.9 %
10 SYRINGE (ML) INJECTION
Status: DISCONTINUED | OUTPATIENT
Start: 2025-01-08 | End: 2025-01-08 | Stop reason: HOSPADM

## 2025-01-08 RX ORDER — DIPHENHYDRAMINE HYDROCHLORIDE 50 MG/ML
50 INJECTION INTRAMUSCULAR; INTRAVENOUS ONCE AS NEEDED
Status: DISCONTINUED | OUTPATIENT
Start: 2025-01-08 | End: 2025-01-08 | Stop reason: HOSPADM

## 2025-01-08 RX ORDER — HEPARIN 100 UNIT/ML
500 SYRINGE INTRAVENOUS
Status: DISCONTINUED | OUTPATIENT
Start: 2025-01-08 | End: 2025-01-08 | Stop reason: HOSPADM

## 2025-01-08 RX ADMIN — DIPHENHYDRAMINE HYDROCHLORIDE 25 MG: 50 INJECTION INTRAMUSCULAR; INTRAVENOUS at 10:01

## 2025-01-08 RX ADMIN — OXALIPLATIN 200 MG: 5 INJECTION, SOLUTION INTRAVENOUS at 11:01

## 2025-01-08 RX ADMIN — SODIUM CHLORIDE 0.25 MG: 9 INJECTION, SOLUTION INTRAVENOUS at 10:01

## 2025-01-08 RX ADMIN — DEXTROSE MONOHYDRATE: 50 INJECTION, SOLUTION INTRAVENOUS at 10:01

## 2025-01-08 NOTE — PROGRESS NOTES
I met with Natasha and her boyfriend, Geovany, as scheduled after she had labs drawn and before she received treatment. Geovany appeared to be defensive in his tone. He stated they need help at home, Natasha is resistant to recommendations and help and wants to maintain her independence. He stated they argue continuously. They do not live together and he works. He is off on Wednesdays and Fridays and can help her on those days. It was hard for me to decipher who was telling the truth about what was going on in the house. They contradicted each other. I reached out to Natasha's daughter after our interaction to express my concerns. She stated her mother left the hospital AMA and has refused all assistance and recommendations. She stated she has noticed Geovany's interaction changed recently. I offered support to Geovany Kaur and Natasha's daughter. I suggest a support group for people who has suffered a stroke and for family members. There is one in [place] on the second Tuesday of every month.

## 2025-01-08 NOTE — PLAN OF CARE
"Patient received C2D1. After completion of oxaliplatin, patient c/o having chest tightness. V/S assess, O2 applied at 2 L per NC, warm blankets, gave a cup of warm cocca.  Once oxygen applied; patient stated " I feel much better."  Monitor approximately 20 minutes afterwards. No signs of resp distress. No further complains from patient. Nurse assisted patient to car via w/c. Left facility in stable condition.   "

## 2025-01-09 ENCOUNTER — TELEPHONE (OUTPATIENT)
Dept: HEMATOLOGY/ONCOLOGY | Facility: CLINIC | Age: 65
End: 2025-01-09
Payer: MEDICARE

## 2025-01-09 ENCOUNTER — DOCUMENTATION ONLY (OUTPATIENT)
Dept: HEMATOLOGY/ONCOLOGY | Facility: CLINIC | Age: 65
End: 2025-01-09
Payer: MEDICARE

## 2025-01-09 NOTE — PROGRESS NOTES
I contacted [facility] regarding my concerns during my interaction between Natasha and her boyfriend. They accepted the report and will follow up with Natasha and see if there are services to offer her.

## 2025-01-13 ENCOUNTER — TELEPHONE (OUTPATIENT)
Dept: HEMATOLOGY/ONCOLOGY | Facility: CLINIC | Age: 65
End: 2025-01-13
Payer: MEDICARE

## 2025-01-13 RX ORDER — HEPARIN 100 UNIT/ML
500 SYRINGE INTRAVENOUS
Status: CANCELLED | OUTPATIENT
Start: 2025-01-15

## 2025-01-13 RX ORDER — SODIUM CHLORIDE 0.9 % (FLUSH) 0.9 %
10 SYRINGE (ML) INJECTION
Status: CANCELLED | OUTPATIENT
Start: 2025-01-15

## 2025-01-15 ENCOUNTER — INFUSION (OUTPATIENT)
Dept: INFUSION THERAPY | Facility: HOSPITAL | Age: 65
End: 2025-01-15
Attending: INTERNAL MEDICINE
Payer: MEDICARE

## 2025-01-15 ENCOUNTER — OFFICE VISIT (OUTPATIENT)
Dept: HEMATOLOGY/ONCOLOGY | Facility: CLINIC | Age: 65
End: 2025-01-15
Payer: MEDICARE

## 2025-01-15 VITALS
RESPIRATION RATE: 18 BRPM | TEMPERATURE: 98 F | DIASTOLIC BLOOD PRESSURE: 75 MMHG | HEART RATE: 98 BPM | SYSTOLIC BLOOD PRESSURE: 118 MMHG | OXYGEN SATURATION: 98 %

## 2025-01-15 DIAGNOSIS — Z85.3 HISTORY OF BREAST CANCER: ICD-10-CM

## 2025-01-15 DIAGNOSIS — R45.89 ANXIETY ABOUT HEALTH: ICD-10-CM

## 2025-01-15 DIAGNOSIS — C20 RECTAL CANCER: ICD-10-CM

## 2025-01-15 DIAGNOSIS — C20 RECTAL CANCER: Primary | ICD-10-CM

## 2025-01-15 PROCEDURE — 90832 PSYTX W PT 30 MINUTES: CPT | Mod: ,,, | Performed by: SOCIAL WORKER

## 2025-01-15 PROCEDURE — 99999 PR PBB SHADOW E&M-EST. PATIENT-LVL I: CPT | Mod: PBBFAC,,, | Performed by: SOCIAL WORKER

## 2025-01-15 PROCEDURE — 99211 OFF/OP EST MAY X REQ PHY/QHP: CPT | Mod: PBBFAC,25 | Performed by: SOCIAL WORKER

## 2025-01-15 PROCEDURE — 96523 IRRIG DRUG DELIVERY DEVICE: CPT

## 2025-01-15 RX ORDER — SODIUM CHLORIDE 0.9 % (FLUSH) 0.9 %
10 SYRINGE (ML) INJECTION
OUTPATIENT
Start: 2025-01-15

## 2025-01-15 RX ORDER — HEPARIN 100 UNIT/ML
500 SYRINGE INTRAVENOUS
Status: DISCONTINUED | OUTPATIENT
Start: 2025-01-15 | End: 2025-01-15 | Stop reason: HOSPADM

## 2025-01-15 RX ORDER — SODIUM CHLORIDE 0.9 % (FLUSH) 0.9 %
10 SYRINGE (ML) INJECTION
Status: DISCONTINUED | OUTPATIENT
Start: 2025-01-15 | End: 2025-01-15 | Stop reason: HOSPADM

## 2025-01-15 RX ORDER — HEPARIN 100 UNIT/ML
500 SYRINGE INTRAVENOUS
OUTPATIENT
Start: 2025-01-15

## 2025-01-15 NOTE — NURSING
PICC care done without difficulty, tolerated well. Discharged in stable condition and is aware of future appointments.

## 2025-01-15 NOTE — PROGRESS NOTES
Individual Psychotherapy (LCSW/PhD)  Natasha Padilla,  1/15/2025    Site: [facility]     Therapeutic Intervention: Met with patient for individual psychotherapy.    Chief complaint/reason for encounter: depression and anxiety     Interval history and content of current session: I met with Natasha for approximately 30 minutes today for a follow up from our initial visit. She arrived with her boy friend, however, I met with her alone. I asked questions about her safety, her living arrangements, her nutrition and her support. She stated she lives alone and stays alone. She is looking into hiring someone to help her 3 days a week. This will be secured within the next 2 weeks. I observed her using her rolling walker and barely standing up alone. Her boyfriend walked behind her to get her to my office. I have concerns about her staying alone and tending to all of her needs and expressed my concerns to her. She states she is always hungry but doesn't feel like eating. She has lost approximately 50 pounds. She does not like Ensure. Her daughter and her boyfriend supply her with food. She cares for two dogs in her home. She has the support of her daughter who lives near by as well as her boyfriend who lives approximately 8 blocks away. She stated he had a significant change of heart and is attentive to her and helpful. He doesn't yell at her and they are bickering less. She continues to smoke about 7-8 cigarettes a day.    Treatment plan:  Target symptoms: depression, anxiety   Why chosen therapy is appropriate versus another modality: patient responds to this modality  Outcome monitoring methods: self-report, observation  Therapeutic intervention type: insight oriented psychotherapy, behavior modifying psychotherapy, supportive psychotherapy, interactive psychotherapy    Risk parameters:  Patient reports no suicidal ideation  Patient reports no homicidal ideation  Patient reports no self-injurious behavior  Patient reports no  violent behavior    Verbal deficits: None    Patient's response to intervention:  The patient's response to intervention is guarded.    Progress toward goals and other mental status changes:  The patient's progress toward goals is fair .    Diagnosis:     ICD-10-CM ICD-9-CM   1. Rectal cancer  C20 154.1   2. History of breast cancer  Z85.3 V10.3   3. Anxiety about health  R45.89 799.29       Plan: Pt plans to continue individual psychotherapy    Return to clinic: as needed    Length of Service (minutes): 30

## 2025-01-23 ENCOUNTER — PATIENT MESSAGE (OUTPATIENT)
Dept: HEMATOLOGY/ONCOLOGY | Facility: CLINIC | Age: 65
End: 2025-01-23
Payer: MEDICARE

## 2025-01-28 ENCOUNTER — TELEPHONE (OUTPATIENT)
Dept: HEMATOLOGY/ONCOLOGY | Facility: CLINIC | Age: 65
End: 2025-01-28
Payer: MEDICARE

## 2025-01-28 ENCOUNTER — PATIENT MESSAGE (OUTPATIENT)
Dept: HEMATOLOGY/ONCOLOGY | Facility: CLINIC | Age: 65
End: 2025-01-28
Payer: MEDICARE

## 2025-01-29 ENCOUNTER — OFFICE VISIT (OUTPATIENT)
Dept: HEMATOLOGY/ONCOLOGY | Facility: CLINIC | Age: 65
End: 2025-01-29
Payer: MEDICARE

## 2025-01-29 ENCOUNTER — INFUSION (OUTPATIENT)
Dept: INFUSION THERAPY | Facility: HOSPITAL | Age: 65
End: 2025-01-29
Attending: INTERNAL MEDICINE
Payer: MEDICARE

## 2025-01-29 VITALS — RESPIRATION RATE: 12 BRPM

## 2025-01-29 DIAGNOSIS — C20 RECTAL CANCER: Primary | ICD-10-CM

## 2025-01-29 DIAGNOSIS — R64 CANCER CACHEXIA: ICD-10-CM

## 2025-01-29 DIAGNOSIS — C78.7 SECONDARY MALIGNANT NEOPLASM OF LIVER: ICD-10-CM

## 2025-01-29 PROCEDURE — 96360 HYDRATION IV INFUSION INIT: CPT

## 2025-01-29 PROCEDURE — 25000003 PHARM REV CODE 250: Performed by: NURSE PRACTITIONER

## 2025-01-29 PROCEDURE — 99999 PR PBB SHADOW E&M-EST. PATIENT-LVL II: CPT | Mod: PBBFAC,,, | Performed by: INTERNAL MEDICINE

## 2025-01-29 PROCEDURE — 99499 UNLISTED E&M SERVICE: CPT | Mod: S$PBB,,, | Performed by: NURSE PRACTITIONER

## 2025-01-29 PROCEDURE — 99212 OFFICE O/P EST SF 10 MIN: CPT | Mod: PBBFAC | Performed by: INTERNAL MEDICINE

## 2025-01-29 PROCEDURE — 99214 OFFICE O/P EST MOD 30 MIN: CPT | Mod: S$PBB,,, | Performed by: INTERNAL MEDICINE

## 2025-01-29 PROCEDURE — 63600175 PHARM REV CODE 636 W HCPCS: Performed by: NURSE PRACTITIONER

## 2025-01-29 RX ORDER — SODIUM CHLORIDE 0.9 % (FLUSH) 0.9 %
10 SYRINGE (ML) INJECTION
Status: CANCELLED | OUTPATIENT
Start: 2025-01-29

## 2025-01-29 RX ORDER — HEPARIN 100 UNIT/ML
500 SYRINGE INTRAVENOUS
Status: CANCELLED | OUTPATIENT
Start: 2025-01-29

## 2025-01-29 RX ORDER — HEPARIN 100 UNIT/ML
500 SYRINGE INTRAVENOUS
Status: DISCONTINUED | OUTPATIENT
Start: 2025-01-29 | End: 2025-01-29 | Stop reason: HOSPADM

## 2025-01-29 RX ORDER — SODIUM CHLORIDE 0.9 % (FLUSH) 0.9 %
10 SYRINGE (ML) INJECTION
Status: DISCONTINUED | OUTPATIENT
Start: 2025-01-29 | End: 2025-01-29 | Stop reason: HOSPADM

## 2025-01-29 RX ADMIN — SODIUM CHLORIDE 8 MG: 9 INJECTION, SOLUTION INTRAVENOUS at 10:01

## 2025-01-29 RX ADMIN — SODIUM CHLORIDE 1000 ML: 9 INJECTION, SOLUTION INTRAVENOUS at 09:01

## 2025-01-29 NOTE — PROGRESS NOTES
Subjective:       Patient ID: Natasha Padilla is a 64 y.o. female.     Chief Complaint:  Progressive weakness, difficulty walking, weight loss, diarrhea and incontinence    Diagnosis: Metastatic rectal cancer                    Stage IA right breast cancer 7/20 (T1b N0 m0) - 1.0 cm, GII, ER/ME+, Her-2 neg, Oncotype RS 17                    Locally advanced, neglected left breast cancer 8/12 - clinical stage IIIC (T4c N3b M0) - ER/ME neg,  Her-2 neg                    S/p bilateral mastectomies                         Postmenopausal with intact uetrus    Treatment History  DD AC x4 --> L mastectomy 1/15 --> Weekly Taxol x12 (5/15) --> XRT  Resection AMARILIS met 6/16  Letrozole 7/20 - 3/22    Current Treatment: Oxaliplatin/Xeloda s/p 2 cycles (started 12/18/24)    Clinical History:  Patient was initially diagnosed with left breast cancer 8/12 by biopsy positive for infiltrating ductal carcinoma.  Pathology showed a grade 3 infiltrating ductal carcinoma, ER +1%, ME +83% and HER-2 equivocal at 2+.  She did not return for follow-up and did not receive any treatment at that time.  She presented with an enlarging left breast mass 10/14 and repeat biopsy at that time showed a grade 2 infiltrating ductal carcinoma that was triple negative for hormone receptor and HER-2 expression.  Metastatic workup was negative.  She received neoadjuvant chemotherapy followed by left mastectomy, adjuvant chemotherapy and radiation.  She had a solitary left upper lobe lung metastasis resected 6/16.  Pathology was consistent with metastatic breast cancer.  She had an early stage breast cancer on the right side with completely different pathology and underwent a right mastectomy followed by adjuvant hormonal therapy with an AI.  She was last seen in the office for follow-up 11/24/21 and did not return for additional visits.  She stopped taking her letrozole several months after her last appointment.  She was not seeing anyone for regular health  maintenance and did not have a primary care provider.    She presented to the ER at [facility] 10/21/24 after her family found her down on the bathroom floor at home.  She had right-sided weakness, affecting the arm more than the leg.  MRI of the brain showed small acute infarcts in the right cerebellum and left occipital lobes.  Admission H&H was 22 and 62.5 leading to a Hematology consult.  A bone marrow aspirate and biopsy was normal and she tested negative for a JAK2 mutation.  She was initiated on anticoagulation and developed rectal bleeding.  She reported a history of rectal bleeding intermittently for several months prior to admission.  Colonoscopy 11/5/24 showed a malignant appearing rectal mass 5-10 cm from the anal verge.  Multiple polyps were also removed.  Biopsy of the rectal mass was positive for invasive adenocarcinoma.      CT C/A/P 11/5/24:  Left apical scarring and a 2 mm nodule in the right mid lung.  There were multiple hepatic metastases, largest in the left lobe measured 3.9 x 3.1 cm.   MRI pelvis 11/6/24: T3 N1 rectal primary.    CT-guided liver biopsy 11/7/24:  Metastatic colorectal adenocarcinoma.  ECHO 10/20/24:  Global HK, EF 40-45%.  Mild-to-moderate MVR.    She was subsequently diagnosed with mitral valve endocarditis and 6 weeks of IV antibiotics were recommended.  She left the hospital AMA without scheduling any additional outpatient follow-up.  Home health was put in place and she completed 6 weeks of IV Unasyn 12/09/24.  Palliative chemotherapy was recommended with IV Oxaliplatin and oral Xeloda.  She received her 1st cycle on 12/18/24.    Interval History  She returns to clinic today for a three-week follow-up visit accompanied by her boyfriend.  She is in a wheelchair secondary to progressive weakness.  She can no longer ambulate independently.  He had to pick her up and carry her to the car.  Her daughter called yesterday to report how significantly her clinical condition has declined  over the past several weeks.  She is now unable to get out of bed unassisted.  She is incontinent of bladder and bowels.  She continues to have diarrhea.  Appetite and oral intake have been very poor.  She denies significant pain.  Her clinical condition is much too poor to continue palliative treatment.      Review of Systems   Constitutional:  Positive for activity change, appetite change, fatigue and unexpected weight change. Negative for fever.   HENT:  Negative for mouth sores, nosebleeds, sore throat and trouble swallowing.    Eyes: Negative.    Respiratory:  Negative for cough and shortness of breath.    Cardiovascular:  Negative for chest pain, palpitations and leg swelling.   Gastrointestinal:  Negative for abdominal distention, abdominal pain, blood in stool, change in bowel habit, constipation, diarrhea and nausea.   Genitourinary:  Negative for dysuria, flank pain, frequency and hematuria.   Musculoskeletal:  Positive for arthralgias. Negative for back pain.   Integumentary:  Negative for pallor and rash.   Neurological:  Positive for speech difficulty (mild), weakness and coordination difficulties. Negative for dizziness, numbness and headaches.   Hematological:  Negative for adenopathy. Does not bruise/bleed easily.   Psychiatric/Behavioral:  Positive for depressed mood. Negative for confusion and suicidal ideas. The patient is nervous/anxious.        PMHx:  CAD with stent, hyperlipidemia, carotid stenosis with right carotid stent, COPD, bilateral breast cancer, CVA, PVD, depression, alcohol abuse  PSHx:  Tonsils, left mastectomy 1/15, MediPort insertion and removal, jaw surgery, left hand surgery, AMARILIS wedge resection 6/16, right carotid stent, iliac artery stent, right mastectomy 7/20, bone marrow  SH:  Long-term smoker 1 ppd, quit 10/24.  Previous heavy alcohol use, quit 10/24.  Lives alone in [place], her daughter lives across the street.  FH:  Her sister had kidney cancer.  A paternal aunt had  breast cancer and colon cancer, another paternal aunt had colon cancer.    Objective:        Physical Exam  Constitutional:       Comments: Cachectic, weak and chronically ill-appearing WF in a wheelchair   HENT:      Mouth/Throat:      Mouth: Mucous membranes are dry.      Pharynx: Oropharynx is clear.   Eyes:      General: No scleral icterus.     Pupils: Pupils are equal, round, and reactive to light.   Cardiovascular:      Rate and Rhythm: Regular rhythm. Tachycardia present.      Heart sounds: No murmur heard.  Pulmonary:      Comments: Decreased breath sounds at the bases, otherwise clear  Chest:      Comments: Well-healed bilateral mastectomy incisions.  No suspicious masses, skin changes or axillary nodes bilaterally.  Abdominal:      General: There is no distension.      Palpations: Abdomen is soft.      Tenderness: There is no abdominal tenderness.      Comments: Bowel sounds decreased.  No appreciable masses.   Musculoskeletal:         General: No swelling or tenderness. Normal range of motion.      Cervical back: Neck supple. No tenderness.   Lymphadenopathy:      Upper Body:      Right upper body: No supraclavicular or axillary adenopathy.      Left upper body: No supraclavicular or axillary adenopathy.   Skin:     General: Skin is warm and dry.      Findings: Bruising (scattered ecchymoses on arms) present. No rash.      Comments: PICC line LUE.  No hand-foot syndrome.  Cyanosis of the fingertips.   Neurological:      Mental Status: She is alert and oriented to person, place, and time.      Cranial Nerves: No cranial nerve deficit.      Motor: Weakness (right-sided weakness, arm> leg) present.      Coordination: Coordination abnormal.       ECOG SCORE    4 - Completely disabled, cannot carry on any selfcare, totally confined to bed or chair          LABORATORY  No results found for this or any previous visit (from the past week).        Assessment:   Metastatic rectal cancer  Recent CVA with right-sided  hemiparesis  Mitral valve endocarditis - s/p 6 weeks of IV antibiotics (completed 12/09/24)  Malnutrition and hypoalbuminemia  Anemia  Stage IA right breast cancer 7/20 - ANNA  Locally advanced, neglected left breast cancer 8/12 - ANNA  S/p bilateral mastectomies  Postmenopausal with intact uterus      Plan:   Her clinical condition and performance status have deteriorated significantly over the past several weeks.  She is not a candidate for further palliative chemotherapy of any kind.  Palliative/supportive care and home hospice care were recommended and discussed in detail with the patient and her boyfriend.  Also discussed recommendations for hospice care with her daughter, savi by phone.  She will be given IV hydration with 1 L of normal saline and Zofran in the office today for palliative symptom management.  Remove PICC line following IV hydration.  Everyone is in agreement with home hospice care.  [facility] we will be consulted at the family's request.      KARLA PARKS MD    Other Physicians  Dr. Marisa Vang

## 2025-01-29 NOTE — NURSING
"Patient here for NS 1000 ml, zofran IV ,and PICC removal. Dr. Marlow spoke to patient and significant other, Geovany. Patient made a decision today to go to hospice.   Patient tolerated treatment. PICC pulled. Noted length 39 cm. No previous documentation of insertion. Geovany, significant other, states insertion was at [facility]. Noted chest x-ray on 10/30/24 "PICC in place."  Geovany stated "we have the arrangements for hospice in [place]".     "

## 2025-02-04 ENCOUNTER — PATIENT MESSAGE (OUTPATIENT)
Dept: HEMATOLOGY/ONCOLOGY | Facility: CLINIC | Age: 65
End: 2025-02-04
Payer: MEDICARE